# Patient Record
Sex: MALE | Race: WHITE
[De-identification: names, ages, dates, MRNs, and addresses within clinical notes are randomized per-mention and may not be internally consistent; named-entity substitution may affect disease eponyms.]

---

## 2018-03-08 ENCOUNTER — HOSPITAL ENCOUNTER (EMERGENCY)
Dept: HOSPITAL 36 - ER | Age: 53
Discharge: HOME | End: 2018-03-08
Payer: COMMERCIAL

## 2018-03-08 DIAGNOSIS — J20.8: Primary | ICD-10-CM

## 2018-03-08 DIAGNOSIS — F17.200: ICD-10-CM

## 2018-03-08 DIAGNOSIS — E11.9: ICD-10-CM

## 2018-03-08 PROCEDURE — Z7502: HCPCS

## 2018-03-08 NOTE — ED PHYSICIAN CHART
ED Chief Complaint/HPI





- Patient Information


Date Seen:: 03/08/18


Time Seen:: 10:30


Chief Complaint:: cough


History of Present Illness:: 





Patient's had a headache and a cough occasionally productive of green sputum 

for last 1 month.  No vomiting.  Patient has had some diarrhea.


Allergies:: 


 Allergies











Allergy/AdvReac Type Severity Reaction Status Date / Time


 


Penicillins Allergy   Verified 03/08/18 10:29











Vitals:: 


 Vital Signs - 8 hr











  03/08/18





  10:30


 


Temp 97.6 F


 





 


RR 22


 


/84


 


O2 Sat % 99











Historian:: Patient


Review:: Nurse's Note Reviewed





ED Review of Systems





- Review of Systems


General/Constitutional: No fever, No chills


Skin: No skin lesions


Head: No headache


Eyes: No loss of vision


ENT: No earache


Neck: No neck pain, No swelling, No thyromegaly, No stiffness


Cardio Vascular: No chest pain, No palpitations


Pulmonary: No SOB, Cough


GI: No nausea, No vomiting, Diarrhea


G/U: No dysuria, No frequency, No hematuria


Musculoskeletal: No bone or joint pain


Endocrine: No polyuria, No polydipsia


Psychiatric: No prior psych history, No depression, No anxiety


Hematopoietic: No bruising


Allergic/Immuno: No urticaria


Neurological: No syncope, No headache, No seizure





ED Past Medical History





- Past Medical History


Past Medical History: DM


Family History: Diabetes Melitus


Social History: Smoker, No Alcohol, Other (smokes one half pack of cigarettes a 

day)


Surgical History: None


Psychiatricy History: None


Medication: Reviewed





Family Medical History





- Family Member


  ** Mother


History Unknown: Yes


Ethnicity: 





ED Physical Exam





- Physical Examination


General/Constitutional: Well-developed, well-nourished, Alert, No distress


Head: Atraumatic


Eyes: Lids, conjuctiva normal, PERRL


Skin: Nl inspection, No rash, No skin lesions, No ecchymosis


ENMT: External ears, nose nl, TM canals nl, Nasal exam nl, Oropharynx nl, 

Tonsils nl


Other ENMT comments:: 





Several teeth carious to the gum line


Neck: No nuchal rigidity


Respiratory: Nl effort/Exclusion, Clear to Auscultation, No Wheeze/Rhonchi/Rales


Cardio Vascular: RRR, No murmur, gallop, rubs, NL S1 S2


GI: No tenderness/rebounding/guarding, No organomegaly, No hernia, Normal BS's


: No CVA tenderness


Extremities: Normal digits & nails


Neuro/Psych: No focal deficits


Misc: No paraspinal tenderness





ED Labs/Radiology/EKG Results





- Lab Results


Results: 


 Laboratory Tests











  03/08/18





  10:35


 


POC Glucose  285 H














ED Septic Shock





- .


Is Septic Shock (SBP<90, OR Lactate>4 mmol\L) present?: No





- <6hrs of presentation:


Vital Signs: 


 Vital Signs - 8 hr











  03/08/18





  10:30


 


Temp 97.6 F


 





 


RR 22


 


/84


 


O2 Sat % 99














ED Reassessment (Disposition)





- Reassessment


Reassessment Condition:: Unchanged





- Diagnosis


Diagnosis:: 





Viral bronchitis





- Aftercare/Follow up Instructions


Aftercare/Follow-Up Instructions:: Refer to Discharge Instructions


Medication Prescribed:: 





Robitussin-DM 4 ounces to take 2 teaspoons 4 times a day as necessary





- Patient Disposition


Discharge/Transfer:: Home


Condition at Disposition:: Stable, Unchanged





ED Discharge Plan





- Patient Disposition


Additional Instructions: 


AS TOLERATED.

## 2018-03-08 NOTE — DIAGNOSTIC IMAGING REPORT
CHEST X-RAY: AP view



INDICATION: Cough for one month



COMPARISON: None



FINDINGS: Patient is mildly rotated.  There is no focal consolidation or

pleural effusions The heart is normal in size. The osseous structures

demonstrate no acute abnormalities.



IMPRESSION: 



No focal consolidation identified.  Consider follow-up if symptoms

persist.

## 2019-07-09 ENCOUNTER — HOSPITAL ENCOUNTER (EMERGENCY)
Dept: HOSPITAL 36 - ER | Age: 54
Discharge: HOME | End: 2019-07-09
Payer: MEDICARE

## 2019-07-09 DIAGNOSIS — Z88.0: ICD-10-CM

## 2019-07-09 DIAGNOSIS — Z59.0: ICD-10-CM

## 2019-07-09 DIAGNOSIS — I10: ICD-10-CM

## 2019-07-09 DIAGNOSIS — M19.90: ICD-10-CM

## 2019-07-09 DIAGNOSIS — E11.9: ICD-10-CM

## 2019-07-09 DIAGNOSIS — G44.1: Primary | ICD-10-CM

## 2019-07-09 SDOH — ECONOMIC STABILITY - HOUSING INSECURITY: HOMELESSNESS: Z59.0

## 2019-07-09 NOTE — ED PHYSICIAN CHART
ED Chief Complaint/HPI





- Patient Information


Date Seen:: 07/09/19


Time Seen:: 14:20


Chief Complaint:: Headaches


History of Present Illness:: 





onset x 3 days of intermittent, dull, diffuse Headaches; pt denies trauma, LOC, 

ALOC, AMS, decreased activity, visual or gait changes, weakness, dizziness, 

paresthesias, vertigo, E/As, S/T, neck pain, cough, C/P, SOB, Abd. Pain, A/N/V/D

/C, bleeding, fever, chills, or urinary s/s; pt is eating and urinating well; 

pt last urinated one hour PTA


Allergies:: 


 Allergies











Allergy/AdvReac Type Severity Reaction Status Date / Time


 


Penicillins Allergy   Verified 07/09/19 14:18











Vitals:: 


 Vital Signs - 8 hr











  07/09/19 07/09/19





  14:21 15:46


 


 100


 


RR 18 16


 


/83 142/80


 


O2 Sat % 99 98











Historian:: Patient


Review:: Nurse's Note Reviewed, Old Chart Reviewed





ED Review of Systems





- Review of Systems


General/Constitutional: No fever, No chills, No weight loss, No weakness, No 

diaphoresis, No edema, No loss of appetite


Skin: No skin lesions, No rash, No bruising


Head: No headache, No light-headedness


Eyes: No loss of vision, No pain, No diplopia


ENT: No earache, No nasal drainage, No sore throat, No tinnitus


Neck: No neck pain, No swelling, No thyromegaly, No stiffness, No mass noted


Cardio Vascular: No chest pain, No palpitations, No PND, No orthopnea, No edema


Pulmonary: No SOB, No cough, No sputum, No wheezing


GI: No nausea, No vomiting, No diarrhea, No pain, No melena, No hematochezia, 

No constipation, No hematemesis


G/U: No dysuria, No frequency, No hematuria, No nacturia


Musculoskeletal: No bone or joint pain, No back pain, No muscle pain


Endocrine: No polyuria, No polydipsia


Psychiatric: No prior psych history, No depression, No anxiety, No suicidal 

ideation, No homicidal ideation, No auditory hallucination, No visual 

hallucination


Hematopoietic: No bruising, No lymphadenopathy


Allergic/Immuno: No urticaria, No angioedema


Neurological: No syncope, No focal symptoms, No weakness, No paresthesia, No 

headache, No seizure, No dizziness, No confusion, No vertigo





ED Past Medical History





- Past Medical History


Obtainable: Yes


Past Medical History: HTN, DM


Family History: Diabetes Melitus, HTN


Social History: Non Smoker, No Alcohol, No Drug Use, Single


Surgical History: None


Psychiatricy History: None


Medication: Reviewed





Family Medical History





- Family Member


  ** Mother


History Unknown: Yes


Ethnicity: 





ED Physical Exam





- Physical Examination


General/Constitutional: Awake, Well-developed, well-nourished, Alert, No 

distress, GCS 15, Non-toxic appearing, Ambulatory


Head: Atraumatic


Other Head comments:: 





PERRLA; Fundi: benign; EOMs: WNL


Eyes: Lids, conjuctiva normal, PERRL, EOMI


Skin: Nl inspection, No rash, No skin lesions, No ecchymosis, Well hydrated, No 

lymphadenopathy


ENMT: External ears, nose nl, TM canals nl, Nasal exam nl, Lips, teeth, gums nl

, Oropharynx nl, Tonsils nl


Other ENMT comments:: 





TMJs: WNL


Neck: Nontender, Full ROM w/o pain, No JVD, No nuchal rigidity, No bruit, No 

mass, No stridor


Other Neck comments:: 





supple; no meningeal signs; no cervical tenderness; no bruits


Respiratory: Nl effort/Exclusion, Clear to Auscultation, No Wheeze/Rhonchi/Rales


Cardio Vascular: RRR, No murmur, gallop, rubs, NL S1 S2, Carotid/Femoral/Distal 

pulses equal bilaterally


GI: No tenderness/rebounding/guarding, No organomegaly, No hernia, Normal BS's, 

Nondistended, No mass/bruits, No McBurney tenderness, Rectum exam nl


Other GI comments:: 





no pulsatile masses


: No CVA tenderness


Extremities: No tenderness or effusion, Full ROM, normal strength in all 

extremities, No edema, Normal digits & nails


Neuro/Psych: Alert/oriented, DTR's symmetric, Normal sensory exam, Normal motor 

strength, Judgement/insight normal, Mood normal, Normal gait, No focal deficits


Other Neuro/Psych comments:: 





no focal signs


Misc: Normal back, No paraspinal tenderness





ED Labs/Radiology/EKG Results





- Lab Results


Comments:: 





Accu-check: Blood Glucose: 145; Final BP: 136/82





- Radiology Results


Comments:: 





DJD Changes; NAD





ED Septic Shock





- .


Is Septic Shock (SBP<90, OR Lactate>4 mmol\L) present?: No





- <6hrs of presentation:


Vital Signs: 


 Vital Signs - 8 hr











  07/09/19 07/09/19





  14:21 15:46


 


 100


 


RR 18 16


 


/83 142/80


 


O2 Sat % 99 98














ED Reassessment (Disposition)





- Reassessment


Reassessment:: 





pt tolerated po fluids well in ER; pt is asymptomatic upon discharge


Reassessment Condition:: Improved





- Diagnosis


Diagnosis:: 





Headaches; DM; HTN; Vascular Cephalgia; Osteoarthritis; Migraine/tension 

Headaches





- Aftercare/Follow up Instructions


Aftercare/Follow-Up Instructions:: Counseled pt regarding lab results/diagnosis 

& need follow up, Refer to Discharge Instructions, Counseled pt & family 

regarding lab results/diagnosis & need follow up





- Patient Disposition


Discharge/Transfer:: Home


Condition at Disposition:: Stable, Improved (X-Rays Instructions; RTER prn if 

existing s/s reoccur and/or get worse and/or any other new s/s occur; ACIs 

given for all above Dx; Have Blood Pressure re-checked in one day by PMD; Refer 

to Neurologist/Internist ASAP; F/U with PMD in one day or prn; RTER prn if 

concerned)

## 2019-07-10 NOTE — DIAGNOSTIC IMAGING REPORT
Head CT without intravenous contrast



Indication: Headache



Comparison: None 



Technique: Axial images were obtained from the vertex to the skull base

without IV contrast. Coronal reconstructions were made.   Total DLP:

724,  [id number]





FINDINGS:



Images of the brain obtained without contrast demonstrate no evidence of

an acute hemorrhage.  The gray-white matter differentiation is

preserved.  The ventricles and basal cisterns are patent.  No mass

effect or midline shift.  No evidence of a skull fracture or focal soft

tissue swelling.  There appear to be multiple scalp nodules.  The

paranasal sinuses are clear.



IMPRESSION:



No acute intracranial abnormality.



There appear to be multiple scalp nodules.  Please correlate with

clinical findings.  Is there a history of etiology such as

neurofibromatosis?

## 2019-07-10 NOTE — DIAGNOSTIC IMAGING REPORT
CT cervical spine without IV contrast



HISTORY: Pain



COMPARISON: None



Technique: Axial images were obtained from the skull base to the upper

thoracic spine without IV contrast. Multiplanar reconstructions were

made. Total DLP: 468,  [id number]



FINDINGS:



Images of the cervical spine obtained without contrast demonstrate no

evidence of an acute fracture or subluxation.  There is reversal of the

cervical lordosis most pronounced at the C5/C6 level.  Multilevel

moderate to advanced degenerative changes are seen with moderate disc

space loss of height at C5/C6.  Multilevel marginal osteophytic spurs

are noted.  There is also large subchondral vacuum phenomena seen along

the posterior aspect of the C5 vertebral body measuring up to 7 mm. 

There are also marked degenerative changes of the atlantodental

articulation with subchondral cyst seen within the anterior odontoid

process measuring 6 mm.  Sclerotic changes of the dens are noted likely

degenerative in etiology.  No prevertebral soft tissue swelling.  There

are moderate to severe facet degenerative changes seen at C2/C3 on the

left no prevertebral soft tissue swelling.  



The lung apices are clear. Atherosclerosis is noted.



IMPRESSION:



No evidence of acute fracture or subluxation



Moderate to advanced degenerative changes as detailed above



Reversal of the cervical lordosis most pronounced at C5/C6.



Atherosclerotic vascular disease.

## 2023-07-28 ENCOUNTER — HOSPITAL ENCOUNTER (INPATIENT)
Dept: HOSPITAL 15 - ER | Age: 58
LOS: 11 days | Discharge: HOME HEALTH | DRG: 602 | End: 2023-08-08
Attending: FAMILY MEDICINE | Admitting: FAMILY MEDICINE
Payer: MEDICARE

## 2023-07-28 VITALS — BODY MASS INDEX: 23.01 KG/M2 | HEIGHT: 67 IN | WEIGHT: 146.61 LBS

## 2023-07-28 VITALS — OXYGEN SATURATION: 99 % | HEART RATE: 102 BPM | RESPIRATION RATE: 25 BRPM

## 2023-07-28 VITALS — OXYGEN SATURATION: 98 % | HEART RATE: 106 BPM | RESPIRATION RATE: 28 BRPM

## 2023-07-28 DIAGNOSIS — E43: ICD-10-CM

## 2023-07-28 DIAGNOSIS — L03.116: Primary | ICD-10-CM

## 2023-07-28 DIAGNOSIS — M86.8X7: ICD-10-CM

## 2023-07-28 DIAGNOSIS — F19.90: ICD-10-CM

## 2023-07-28 DIAGNOSIS — Z91.148: ICD-10-CM

## 2023-07-28 DIAGNOSIS — Z88.0: ICD-10-CM

## 2023-07-28 DIAGNOSIS — E11.65: ICD-10-CM

## 2023-07-28 DIAGNOSIS — F17.210: ICD-10-CM

## 2023-07-28 DIAGNOSIS — E11.69: ICD-10-CM

## 2023-07-28 DIAGNOSIS — E11.621: ICD-10-CM

## 2023-07-28 DIAGNOSIS — Z59.00: ICD-10-CM

## 2023-07-28 DIAGNOSIS — Z79.899: ICD-10-CM

## 2023-07-28 DIAGNOSIS — L97.529: ICD-10-CM

## 2023-07-28 DIAGNOSIS — I10: ICD-10-CM

## 2023-07-28 LAB
ALBUMIN SERPL-MCNC: 2.7 G/DL (ref 3.4–5)
ALP SERPL-CCNC: 114 U/L (ref 45–117)
ALT SERPL-CCNC: 20 U/L (ref 16–61)
ANION GAP SERPL CALCULATED.3IONS-SCNC: 6 MMOL/L (ref 5–15)
BILIRUB SERPL-MCNC: 0.2 MG/DL (ref 0.2–1)
BUN SERPL-MCNC: 16 MG/DL (ref 7–18)
BUN/CREAT SERPL: 9.7 (ref 10–20)
CALCIUM SERPL-MCNC: 8.4 MG/DL (ref 8.5–10.1)
CHLORIDE SERPL-SCNC: 108 MMOL/L (ref 98–107)
CO2 SERPL-SCNC: 28 MMOL/L (ref 21–32)
GLUCOSE SERPL-MCNC: 244 MG/DL (ref 74–106)
HCT VFR BLD AUTO: 26.7 % (ref 41–53)
HGB BLD-MCNC: 8.7 G/DL (ref 13.5–17.5)
LIPASE SERPL-CCNC: 147 U/L (ref 73–393)
MCH RBC QN AUTO: 28.4 PG (ref 28–32)
MCV RBC AUTO: 86.8 FL (ref 80–100)
NRBC BLD QL AUTO: 0 %
POTASSIUM SERPL-SCNC: 3.9 MMOL/L (ref 3.5–5.1)
PROT SERPL-MCNC: 7.8 G/DL (ref 6.4–8.2)
PROT UR STRIP.AUTO-MCNC: (no result) MG/DL
SODIUM SERPL-SCNC: 142 MMOL/L (ref 136–145)

## 2023-07-28 PROCEDURE — 80048 BASIC METABOLIC PNL TOTAL CA: CPT

## 2023-07-28 PROCEDURE — 86900 BLOOD TYPING SEROLOGIC ABO: CPT

## 2023-07-28 PROCEDURE — 81001 URINALYSIS AUTO W/SCOPE: CPT

## 2023-07-28 PROCEDURE — 86850 RBC ANTIBODY SCREEN: CPT

## 2023-07-28 PROCEDURE — 80053 COMPREHEN METABOLIC PANEL: CPT

## 2023-07-28 PROCEDURE — 87040 BLOOD CULTURE FOR BACTERIA: CPT

## 2023-07-28 PROCEDURE — 83036 HEMOGLOBIN GLYCOSYLATED A1C: CPT

## 2023-07-28 PROCEDURE — 80202 ASSAY OF VANCOMYCIN: CPT

## 2023-07-28 PROCEDURE — 87077 CULTURE AEROBIC IDENTIFY: CPT

## 2023-07-28 PROCEDURE — 84484 ASSAY OF TROPONIN QUANT: CPT

## 2023-07-28 PROCEDURE — 93017 CV STRESS TEST TRACING ONLY: CPT

## 2023-07-28 PROCEDURE — 85025 COMPLETE CBC W/AUTO DIFF WBC: CPT

## 2023-07-28 PROCEDURE — 73700 CT LOWER EXTREMITY W/O DYE: CPT

## 2023-07-28 PROCEDURE — 87205 SMEAR GRAM STAIN: CPT

## 2023-07-28 PROCEDURE — 83605 ASSAY OF LACTIC ACID: CPT

## 2023-07-28 PROCEDURE — 86901 BLOOD TYPING SEROLOGIC RH(D): CPT

## 2023-07-28 PROCEDURE — 71045 X-RAY EXAM CHEST 1 VIEW: CPT

## 2023-07-28 PROCEDURE — 36569 INSJ PICC 5 YR+ W/O IMAGING: CPT

## 2023-07-28 PROCEDURE — 36415 COLL VENOUS BLD VENIPUNCTURE: CPT

## 2023-07-28 PROCEDURE — 85730 THROMBOPLASTIN TIME PARTIAL: CPT

## 2023-07-28 PROCEDURE — 78452 HT MUSCLE IMAGE SPECT MULT: CPT

## 2023-07-28 PROCEDURE — 83690 ASSAY OF LIPASE: CPT

## 2023-07-28 PROCEDURE — 93306 TTE W/DOPPLER COMPLETE: CPT

## 2023-07-28 PROCEDURE — 80061 LIPID PANEL: CPT

## 2023-07-28 PROCEDURE — 87081 CULTURE SCREEN ONLY: CPT

## 2023-07-28 PROCEDURE — 93005 ELECTROCARDIOGRAM TRACING: CPT

## 2023-07-28 PROCEDURE — 93925 LOWER EXTREMITY STUDY: CPT

## 2023-07-28 PROCEDURE — 84443 ASSAY THYROID STIM HORMONE: CPT

## 2023-07-28 PROCEDURE — 80307 DRUG TEST PRSMV CHEM ANLYZR: CPT

## 2023-07-28 PROCEDURE — 85610 PROTHROMBIN TIME: CPT

## 2023-07-28 PROCEDURE — 82962 GLUCOSE BLOOD TEST: CPT

## 2023-07-28 PROCEDURE — 73630 X-RAY EXAM OF FOOT: CPT

## 2023-07-28 PROCEDURE — 83880 ASSAY OF NATRIURETIC PEPTIDE: CPT

## 2023-07-28 PROCEDURE — 87186 SC STD MICRODIL/AGAR DIL: CPT

## 2023-07-28 RX ADMIN — SODIUM CHLORIDE SCH MLS/HR: 4.5 INJECTION, SOLUTION INTRAVENOUS at 23:24

## 2023-07-28 SDOH — ECONOMIC STABILITY - HOUSING INSECURITY: HOMELESSNESS UNSPECIFIED: Z59.00

## 2023-07-29 VITALS
DIASTOLIC BLOOD PRESSURE: 88 MMHG | RESPIRATION RATE: 19 BRPM | HEART RATE: 96 BPM | TEMPERATURE: 97.5 F | SYSTOLIC BLOOD PRESSURE: 146 MMHG | OXYGEN SATURATION: 98 %

## 2023-07-29 VITALS
SYSTOLIC BLOOD PRESSURE: 114 MMHG | OXYGEN SATURATION: 100 % | DIASTOLIC BLOOD PRESSURE: 72 MMHG | TEMPERATURE: 97.8 F | RESPIRATION RATE: 18 BRPM | HEART RATE: 97 BPM

## 2023-07-29 VITALS — OXYGEN SATURATION: 100 % | RESPIRATION RATE: 18 BRPM

## 2023-07-29 VITALS
OXYGEN SATURATION: 96 % | DIASTOLIC BLOOD PRESSURE: 74 MMHG | TEMPERATURE: 98.1 F | RESPIRATION RATE: 18 BRPM | SYSTOLIC BLOOD PRESSURE: 137 MMHG | HEART RATE: 81 BPM

## 2023-07-29 VITALS
DIASTOLIC BLOOD PRESSURE: 72 MMHG | SYSTOLIC BLOOD PRESSURE: 114 MMHG | RESPIRATION RATE: 18 BRPM | OXYGEN SATURATION: 100 % | HEART RATE: 97 BPM | TEMPERATURE: 97.8 F

## 2023-07-29 VITALS
HEART RATE: 96 BPM | SYSTOLIC BLOOD PRESSURE: 146 MMHG | TEMPERATURE: 97.5 F | OXYGEN SATURATION: 98 % | DIASTOLIC BLOOD PRESSURE: 88 MMHG | RESPIRATION RATE: 19 BRPM

## 2023-07-29 VITALS — HEART RATE: 80 BPM | OXYGEN SATURATION: 97 % | RESPIRATION RATE: 18 BRPM

## 2023-07-29 LAB
ALBUMIN SERPL-MCNC: 2.4 G/DL (ref 3.4–5)
ALCOHOL, URINE: < 3 MG/DL (ref 0–10)
ALP SERPL-CCNC: 125 U/L (ref 45–117)
ALT SERPL-CCNC: 18 U/L (ref 16–61)
AMPHETAMINES UR QL SCN: POSITIVE
ANION GAP SERPL CALCULATED.3IONS-SCNC: 4 MMOL/L (ref 5–15)
BARBITURATES UR QL SCN: NEGATIVE
BENZODIAZ UR QL SCN: NEGATIVE
BILIRUB SERPL-MCNC: 0.1 MG/DL (ref 0.2–1)
BUN SERPL-MCNC: 20 MG/DL (ref 7–18)
BUN/CREAT SERPL: 11 (ref 10–20)
BZE UR QL SCN: NEGATIVE
CALCIUM SERPL-MCNC: 7.9 MG/DL (ref 8.5–10.1)
CANNABINOIDS UR QL SCN: NEGATIVE
CHLORIDE SERPL-SCNC: 109 MMOL/L (ref 98–107)
CO2 SERPL-SCNC: 28 MMOL/L (ref 21–32)
GLUCOSE SERPL-MCNC: 262 MG/DL (ref 74–106)
HCT VFR BLD AUTO: 25.2 % (ref 41–53)
HGB BLD-MCNC: 8.3 G/DL (ref 13.5–17.5)
MCH RBC QN AUTO: 28.7 PG (ref 28–32)
MCV RBC AUTO: 87.6 FL (ref 80–100)
NRBC BLD QL AUTO: 0 %
OPIATES UR QL SCN: NEGATIVE
PCP UR QL SCN: NEGATIVE
POTASSIUM SERPL-SCNC: 4.1 MMOL/L (ref 3.5–5.1)
PROT SERPL-MCNC: 7.2 G/DL (ref 6.4–8.2)
SODIUM SERPL-SCNC: 141 MMOL/L (ref 136–145)

## 2023-07-29 RX ADMIN — HUMAN INSULIN SCH UNITS: 100 INJECTION, SOLUTION SUBCUTANEOUS at 11:30

## 2023-07-29 RX ADMIN — OXYCODONE HYDROCHLORIDE AND ACETAMINOPHEN SCH MG: 500 TABLET ORAL at 10:00

## 2023-07-29 RX ADMIN — HYDROCODONE BITARTRATE AND ACETAMINOPHEN PRN TAB: 5; 325 TABLET ORAL at 11:47

## 2023-07-29 RX ADMIN — Medication SCH STRIP: at 11:37

## 2023-07-29 RX ADMIN — HUMAN INSULIN SCH UNITS: 100 INJECTION, SOLUTION SUBCUTANEOUS at 17:52

## 2023-07-29 RX ADMIN — Medication SCH STRIP: at 06:33

## 2023-07-29 RX ADMIN — VANCOMYCIN HYDROCHLORIDE SCH MLS/HR: 1 INJECTION, POWDER, LYOPHILIZED, FOR SOLUTION INTRAVENOUS at 15:01

## 2023-07-29 RX ADMIN — HUMAN INSULIN SCH UNITS: 100 INJECTION, SOLUTION SUBCUTANEOUS at 06:37

## 2023-07-29 RX ADMIN — OXYCODONE HYDROCHLORIDE AND ACETAMINOPHEN SCH MG: 500 TABLET ORAL at 22:02

## 2023-07-29 RX ADMIN — HUMAN INSULIN SCH UNITS: 100 INJECTION, SOLUTION SUBCUTANEOUS at 22:00

## 2023-07-29 RX ADMIN — DEXTROSE SCH UNITS: 5 SOLUTION INTRAVENOUS at 22:02

## 2023-07-29 RX ADMIN — ZINC SULFATE CAP 220 MG (50 MG ELEMENTAL ZN) SCH MG: 220 (50 ZN) CAP at 10:00

## 2023-07-29 RX ADMIN — Medication SCH STRIP: at 21:57

## 2023-07-29 RX ADMIN — SODIUM CHLORIDE SCH MLS/HR: 4.5 INJECTION, SOLUTION INTRAVENOUS at 18:30

## 2023-07-29 RX ADMIN — HYDROCODONE BITARTRATE AND ACETAMINOPHEN PRN TAB: 5; 325 TABLET ORAL at 00:16

## 2023-07-29 RX ADMIN — HYDRALAZINE HYDROCHLORIDE PRN MG: 20 INJECTION INTRAMUSCULAR; INTRAVENOUS at 07:04

## 2023-07-29 RX ADMIN — Medication SCH STRIP: at 17:51

## 2023-07-29 RX ADMIN — DEXTROSE SCH UNITS: 5 SOLUTION INTRAVENOUS at 10:00

## 2023-07-30 VITALS
OXYGEN SATURATION: 97 % | DIASTOLIC BLOOD PRESSURE: 79 MMHG | SYSTOLIC BLOOD PRESSURE: 137 MMHG | TEMPERATURE: 98.6 F | HEART RATE: 91 BPM | RESPIRATION RATE: 18 BRPM

## 2023-07-30 VITALS
OXYGEN SATURATION: 98 % | SYSTOLIC BLOOD PRESSURE: 127 MMHG | RESPIRATION RATE: 14 BRPM | TEMPERATURE: 97.8 F | DIASTOLIC BLOOD PRESSURE: 64 MMHG | HEART RATE: 100 BPM

## 2023-07-30 VITALS
SYSTOLIC BLOOD PRESSURE: 145 MMHG | OXYGEN SATURATION: 94 % | TEMPERATURE: 98.3 F | DIASTOLIC BLOOD PRESSURE: 76 MMHG | HEART RATE: 76 BPM | RESPIRATION RATE: 18 BRPM

## 2023-07-30 VITALS — RESPIRATION RATE: 18 BRPM | OXYGEN SATURATION: 100 %

## 2023-07-30 VITALS
RESPIRATION RATE: 16 BRPM | OXYGEN SATURATION: 94 % | DIASTOLIC BLOOD PRESSURE: 74 MMHG | HEART RATE: 100 BPM | TEMPERATURE: 97.5 F | SYSTOLIC BLOOD PRESSURE: 110 MMHG

## 2023-07-30 VITALS
SYSTOLIC BLOOD PRESSURE: 149 MMHG | TEMPERATURE: 97.9 F | RESPIRATION RATE: 16 BRPM | DIASTOLIC BLOOD PRESSURE: 80 MMHG | OXYGEN SATURATION: 99 % | HEART RATE: 92 BPM

## 2023-07-30 VITALS — RESPIRATION RATE: 18 BRPM | HEART RATE: 82 BPM | OXYGEN SATURATION: 95 %

## 2023-07-30 LAB
APTT PPP: 30.5 SEC (ref 24.5–34.5)
CHOLEST SERPL-MCNC: 152 MG/DL (ref ?–200)
HDLC SERPL-MCNC: 34 MG/DL (ref 40–59)
INR PPP: 1.05 (ref 0.9–1.15)
PROTHROMBIN TIME: 11 SEC (ref 9.3–11.8)
TRIGL SERPL-MCNC: 257 MG/DL (ref ?–150)

## 2023-07-30 RX ADMIN — ATORVASTATIN CALCIUM SCH MG: 20 TABLET, FILM COATED ORAL at 21:21

## 2023-07-30 RX ADMIN — Medication SCH STRIP: at 12:04

## 2023-07-30 RX ADMIN — OXYCODONE HYDROCHLORIDE AND ACETAMINOPHEN SCH MG: 500 TABLET ORAL at 21:21

## 2023-07-30 RX ADMIN — HUMAN INSULIN SCH UNITS: 100 INJECTION, SOLUTION SUBCUTANEOUS at 12:06

## 2023-07-30 RX ADMIN — DEXTROSE SCH UNITS: 5 SOLUTION INTRAVENOUS at 21:27

## 2023-07-30 RX ADMIN — Medication SCH STRIP: at 17:00

## 2023-07-30 RX ADMIN — VANCOMYCIN HYDROCHLORIDE SCH MLS/HR: 1 INJECTION, POWDER, LYOPHILIZED, FOR SOLUTION INTRAVENOUS at 14:11

## 2023-07-30 RX ADMIN — Medication SCH STRIP: at 06:20

## 2023-07-30 RX ADMIN — SODIUM CHLORIDE SCH MLS/HR: 4.5 INJECTION, SOLUTION INTRAVENOUS at 14:30

## 2023-07-30 RX ADMIN — HUMAN INSULIN SCH UNITS: 100 INJECTION, SOLUTION SUBCUTANEOUS at 06:24

## 2023-07-30 RX ADMIN — HUMAN INSULIN SCH UNITS: 100 INJECTION, SOLUTION SUBCUTANEOUS at 18:04

## 2023-07-30 RX ADMIN — DEXTROSE SCH UNITS: 5 SOLUTION INTRAVENOUS at 09:50

## 2023-07-30 RX ADMIN — ZINC SULFATE CAP 220 MG (50 MG ELEMENTAL ZN) SCH MG: 220 (50 ZN) CAP at 09:45

## 2023-07-30 RX ADMIN — Medication SCH STRIP: at 21:15

## 2023-07-30 RX ADMIN — HUMAN INSULIN SCH UNITS: 100 INJECTION, SOLUTION SUBCUTANEOUS at 21:19

## 2023-07-30 RX ADMIN — OXYCODONE HYDROCHLORIDE AND ACETAMINOPHEN SCH MG: 500 TABLET ORAL at 09:44

## 2023-07-30 RX ADMIN — METOPROLOL SUCCINATE SCH MG: 50 TABLET, EXTENDED RELEASE ORAL at 14:10

## 2023-07-31 VITALS
HEART RATE: 83 BPM | RESPIRATION RATE: 20 BRPM | SYSTOLIC BLOOD PRESSURE: 112 MMHG | DIASTOLIC BLOOD PRESSURE: 52 MMHG | TEMPERATURE: 97.8 F | OXYGEN SATURATION: 95 %

## 2023-07-31 VITALS
DIASTOLIC BLOOD PRESSURE: 58 MMHG | RESPIRATION RATE: 21 BRPM | SYSTOLIC BLOOD PRESSURE: 140 MMHG | HEART RATE: 71 BPM | OXYGEN SATURATION: 96 % | TEMPERATURE: 97.8 F

## 2023-07-31 VITALS
SYSTOLIC BLOOD PRESSURE: 118 MMHG | TEMPERATURE: 98.4 F | HEART RATE: 73 BPM | DIASTOLIC BLOOD PRESSURE: 52 MMHG | OXYGEN SATURATION: 98 % | RESPIRATION RATE: 17 BRPM

## 2023-07-31 VITALS — OXYGEN SATURATION: 96 % | RESPIRATION RATE: 18 BRPM

## 2023-07-31 VITALS
SYSTOLIC BLOOD PRESSURE: 158 MMHG | HEART RATE: 72 BPM | RESPIRATION RATE: 17 BRPM | OXYGEN SATURATION: 96 % | TEMPERATURE: 98.2 F | DIASTOLIC BLOOD PRESSURE: 64 MMHG

## 2023-07-31 VITALS
OXYGEN SATURATION: 98 % | SYSTOLIC BLOOD PRESSURE: 108 MMHG | HEART RATE: 74 BPM | TEMPERATURE: 97.9 F | DIASTOLIC BLOOD PRESSURE: 57 MMHG | RESPIRATION RATE: 15 BRPM

## 2023-07-31 VITALS — DIASTOLIC BLOOD PRESSURE: 84 MMHG | SYSTOLIC BLOOD PRESSURE: 146 MMHG

## 2023-07-31 LAB
ANION GAP SERPL CALCULATED.3IONS-SCNC: 7 MMOL/L (ref 5–15)
BUN SERPL-MCNC: 36 MG/DL (ref 7–18)
BUN/CREAT SERPL: 18.7 (ref 10–20)
CALCIUM SERPL-MCNC: 8 MG/DL (ref 8.5–10.1)
CHLORIDE SERPL-SCNC: 107 MMOL/L (ref 98–107)
CO2 SERPL-SCNC: 25 MMOL/L (ref 21–32)
GLUCOSE SERPL-MCNC: 148 MG/DL (ref 74–106)
POTASSIUM SERPL-SCNC: 4.3 MMOL/L (ref 3.5–5.1)
SODIUM SERPL-SCNC: 139 MMOL/L (ref 136–145)

## 2023-07-31 PROCEDURE — 02HV33Z INSERTION OF INFUSION DEVICE INTO SUPERIOR VENA CAVA, PERCUTANEOUS APPROACH: ICD-10-PCS | Performed by: FAMILY MEDICINE

## 2023-07-31 PROCEDURE — B548ZZA ULTRASONOGRAPHY OF SUPERIOR VENA CAVA, GUIDANCE: ICD-10-PCS | Performed by: FAMILY MEDICINE

## 2023-07-31 RX ADMIN — NICOTINE SCH PATCH: 21 PATCH, EXTENDED RELEASE TRANSDERMAL at 10:18

## 2023-07-31 RX ADMIN — Medication SCH STRIP: at 13:22

## 2023-07-31 RX ADMIN — VANCOMYCIN HYDROCHLORIDE SCH MLS/HR: 1 INJECTION, POWDER, LYOPHILIZED, FOR SOLUTION INTRAVENOUS at 13:21

## 2023-07-31 RX ADMIN — METOPROLOL SUCCINATE SCH MG: 50 TABLET, EXTENDED RELEASE ORAL at 13:22

## 2023-07-31 RX ADMIN — Medication SCH STRIP: at 17:50

## 2023-07-31 RX ADMIN — HYDROCODONE BITARTRATE AND ACETAMINOPHEN PRN TAB: 5; 325 TABLET ORAL at 13:24

## 2023-07-31 RX ADMIN — Medication SCH STRIP: at 06:31

## 2023-07-31 RX ADMIN — OXYCODONE HYDROCHLORIDE AND ACETAMINOPHEN SCH MG: 500 TABLET ORAL at 10:18

## 2023-07-31 RX ADMIN — DEXTROSE SCH UNITS: 5 SOLUTION INTRAVENOUS at 21:32

## 2023-07-31 RX ADMIN — HUMAN INSULIN SCH UNITS: 100 INJECTION, SOLUTION SUBCUTANEOUS at 17:51

## 2023-07-31 RX ADMIN — Medication SCH STRIP: at 21:24

## 2023-07-31 RX ADMIN — DEXTROSE SCH UNITS: 5 SOLUTION INTRAVENOUS at 10:25

## 2023-07-31 RX ADMIN — ZINC SULFATE CAP 220 MG (50 MG ELEMENTAL ZN) SCH MG: 220 (50 ZN) CAP at 10:18

## 2023-07-31 RX ADMIN — OXYCODONE HYDROCHLORIDE AND ACETAMINOPHEN SCH MG: 500 TABLET ORAL at 21:33

## 2023-07-31 RX ADMIN — HUMAN INSULIN SCH UNITS: 100 INJECTION, SOLUTION SUBCUTANEOUS at 13:29

## 2023-07-31 RX ADMIN — ATORVASTATIN CALCIUM SCH MG: 20 TABLET, FILM COATED ORAL at 21:33

## 2023-07-31 RX ADMIN — SODIUM CHLORIDE SCH MLS/HR: 4.5 INJECTION, SOLUTION INTRAVENOUS at 10:25

## 2023-07-31 RX ADMIN — HUMAN INSULIN SCH UNITS: 100 INJECTION, SOLUTION SUBCUTANEOUS at 06:31

## 2023-07-31 RX ADMIN — SODIUM CHLORIDE SCH ML: 9 INJECTION INTRAMUSCULAR; INTRAVENOUS; SUBCUTANEOUS at 21:33

## 2023-07-31 RX ADMIN — HUMAN INSULIN SCH UNITS: 100 INJECTION, SOLUTION SUBCUTANEOUS at 21:32

## 2023-08-01 VITALS
TEMPERATURE: 98.5 F | OXYGEN SATURATION: 94 % | RESPIRATION RATE: 20 BRPM | HEART RATE: 72 BPM | SYSTOLIC BLOOD PRESSURE: 142 MMHG | DIASTOLIC BLOOD PRESSURE: 67 MMHG

## 2023-08-01 VITALS
OXYGEN SATURATION: 96 % | HEART RATE: 71 BPM | SYSTOLIC BLOOD PRESSURE: 146 MMHG | TEMPERATURE: 98.3 F | RESPIRATION RATE: 17 BRPM | DIASTOLIC BLOOD PRESSURE: 53 MMHG

## 2023-08-01 VITALS
TEMPERATURE: 98 F | RESPIRATION RATE: 21 BRPM | DIASTOLIC BLOOD PRESSURE: 62 MMHG | OXYGEN SATURATION: 99 % | SYSTOLIC BLOOD PRESSURE: 135 MMHG | HEART RATE: 78 BPM

## 2023-08-01 VITALS
OXYGEN SATURATION: 97 % | DIASTOLIC BLOOD PRESSURE: 67 MMHG | RESPIRATION RATE: 20 BRPM | HEART RATE: 72 BPM | SYSTOLIC BLOOD PRESSURE: 142 MMHG | TEMPERATURE: 98.5 F

## 2023-08-01 VITALS — HEART RATE: 76 BPM | RESPIRATION RATE: 18 BRPM

## 2023-08-01 VITALS
TEMPERATURE: 98 F | HEART RATE: 69 BPM | RESPIRATION RATE: 21 BRPM | OXYGEN SATURATION: 98 % | DIASTOLIC BLOOD PRESSURE: 48 MMHG | SYSTOLIC BLOOD PRESSURE: 110 MMHG

## 2023-08-01 LAB
ANION GAP SERPL CALCULATED.3IONS-SCNC: 6 MMOL/L (ref 5–15)
BUN SERPL-MCNC: 37 MG/DL (ref 7–18)
BUN/CREAT SERPL: 22.4 (ref 10–20)
CALCIUM SERPL-MCNC: 7.6 MG/DL (ref 8.5–10.1)
CHLORIDE SERPL-SCNC: 108 MMOL/L (ref 98–107)
CO2 SERPL-SCNC: 24 MMOL/L (ref 21–32)
GLUCOSE SERPL-MCNC: 256 MG/DL (ref 74–106)
POTASSIUM SERPL-SCNC: 4 MMOL/L (ref 3.5–5.1)
SODIUM SERPL-SCNC: 138 MMOL/L (ref 136–145)

## 2023-08-01 RX ADMIN — HUMAN INSULIN SCH UNITS: 100 INJECTION, SOLUTION SUBCUTANEOUS at 06:22

## 2023-08-01 RX ADMIN — HYDROCODONE BITARTRATE AND ACETAMINOPHEN PRN TAB: 5; 325 TABLET ORAL at 01:29

## 2023-08-01 RX ADMIN — HUMAN INSULIN SCH UNITS: 100 INJECTION, SOLUTION SUBCUTANEOUS at 11:16

## 2023-08-01 RX ADMIN — OXYCODONE HYDROCHLORIDE AND ACETAMINOPHEN SCH MG: 500 TABLET ORAL at 11:07

## 2023-08-01 RX ADMIN — HUMAN INSULIN SCH UNITS: 100 INJECTION, SOLUTION SUBCUTANEOUS at 17:31

## 2023-08-01 RX ADMIN — Medication SCH STRIP: at 22:02

## 2023-08-01 RX ADMIN — SODIUM CHLORIDE SCH ML: 9 INJECTION INTRAMUSCULAR; INTRAVENOUS; SUBCUTANEOUS at 11:06

## 2023-08-01 RX ADMIN — OXYCODONE HYDROCHLORIDE AND ACETAMINOPHEN SCH MG: 500 TABLET ORAL at 22:02

## 2023-08-01 RX ADMIN — DEXTROSE SCH UNITS: 5 SOLUTION INTRAVENOUS at 11:14

## 2023-08-01 RX ADMIN — DEXTROSE SCH UNITS: 5 SOLUTION INTRAVENOUS at 22:00

## 2023-08-01 RX ADMIN — Medication SCH STRIP: at 11:16

## 2023-08-01 RX ADMIN — METOPROLOL SUCCINATE SCH MG: 50 TABLET, EXTENDED RELEASE ORAL at 11:06

## 2023-08-01 RX ADMIN — SODIUM CHLORIDE SCH MLS/HR: 4.5 INJECTION, SOLUTION INTRAVENOUS at 06:24

## 2023-08-01 RX ADMIN — Medication SCH STRIP: at 06:19

## 2023-08-01 RX ADMIN — Medication SCH STRIP: at 17:25

## 2023-08-01 RX ADMIN — ZINC SULFATE CAP 220 MG (50 MG ELEMENTAL ZN) SCH MG: 220 (50 ZN) CAP at 11:06

## 2023-08-01 RX ADMIN — SODIUM CHLORIDE SCH ML: 9 INJECTION INTRAMUSCULAR; INTRAVENOUS; SUBCUTANEOUS at 22:02

## 2023-08-01 RX ADMIN — NICOTINE SCH PATCH: 21 PATCH, EXTENDED RELEASE TRANSDERMAL at 11:07

## 2023-08-01 RX ADMIN — HUMAN INSULIN SCH UNITS: 100 INJECTION, SOLUTION SUBCUTANEOUS at 22:06

## 2023-08-01 RX ADMIN — ATORVASTATIN CALCIUM SCH MG: 20 TABLET, FILM COATED ORAL at 22:02

## 2023-08-02 VITALS
RESPIRATION RATE: 18 BRPM | SYSTOLIC BLOOD PRESSURE: 151 MMHG | OXYGEN SATURATION: 99 % | TEMPERATURE: 98.1 F | HEART RATE: 66 BPM | DIASTOLIC BLOOD PRESSURE: 68 MMHG

## 2023-08-02 VITALS
TEMPERATURE: 98.1 F | OXYGEN SATURATION: 100 % | HEART RATE: 67 BPM | SYSTOLIC BLOOD PRESSURE: 130 MMHG | DIASTOLIC BLOOD PRESSURE: 70 MMHG | RESPIRATION RATE: 18 BRPM

## 2023-08-02 VITALS
OXYGEN SATURATION: 97 % | SYSTOLIC BLOOD PRESSURE: 142 MMHG | TEMPERATURE: 98.5 F | HEART RATE: 72 BPM | DIASTOLIC BLOOD PRESSURE: 67 MMHG | RESPIRATION RATE: 20 BRPM

## 2023-08-02 VITALS
OXYGEN SATURATION: 98 % | RESPIRATION RATE: 18 BRPM | SYSTOLIC BLOOD PRESSURE: 158 MMHG | DIASTOLIC BLOOD PRESSURE: 70 MMHG | HEART RATE: 87 BPM | TEMPERATURE: 97.9 F

## 2023-08-02 VITALS
DIASTOLIC BLOOD PRESSURE: 73 MMHG | HEART RATE: 74 BPM | RESPIRATION RATE: 20 BRPM | TEMPERATURE: 98.3 F | SYSTOLIC BLOOD PRESSURE: 150 MMHG | OXYGEN SATURATION: 99 %

## 2023-08-02 VITALS
DIASTOLIC BLOOD PRESSURE: 70 MMHG | SYSTOLIC BLOOD PRESSURE: 152 MMHG | TEMPERATURE: 97.9 F | HEART RATE: 70 BPM | RESPIRATION RATE: 18 BRPM | OXYGEN SATURATION: 98 %

## 2023-08-02 VITALS
RESPIRATION RATE: 21 BRPM | TEMPERATURE: 98.3 F | SYSTOLIC BLOOD PRESSURE: 148 MMHG | HEART RATE: 67 BPM | OXYGEN SATURATION: 95 % | DIASTOLIC BLOOD PRESSURE: 72 MMHG

## 2023-08-02 RX ADMIN — HUMAN INSULIN SCH UNITS: 100 INJECTION, SOLUTION SUBCUTANEOUS at 06:22

## 2023-08-02 RX ADMIN — Medication SCH STRIP: at 21:32

## 2023-08-02 RX ADMIN — Medication SCH STRIP: at 17:08

## 2023-08-02 RX ADMIN — HUMAN INSULIN SCH UNITS: 100 INJECTION, SOLUTION SUBCUTANEOUS at 11:41

## 2023-08-02 RX ADMIN — DEXTROSE SCH UNITS: 5 SOLUTION INTRAVENOUS at 21:36

## 2023-08-02 RX ADMIN — SODIUM CHLORIDE SCH ML: 9 INJECTION INTRAMUSCULAR; INTRAVENOUS; SUBCUTANEOUS at 09:16

## 2023-08-02 RX ADMIN — ZINC SULFATE CAP 220 MG (50 MG ELEMENTAL ZN) SCH MG: 220 (50 ZN) CAP at 09:15

## 2023-08-02 RX ADMIN — METOPROLOL SUCCINATE SCH MG: 50 TABLET, EXTENDED RELEASE ORAL at 09:16

## 2023-08-02 RX ADMIN — DEXTROSE SCH UNITS: 5 SOLUTION INTRAVENOUS at 11:42

## 2023-08-02 RX ADMIN — OXYCODONE HYDROCHLORIDE AND ACETAMINOPHEN SCH MG: 500 TABLET ORAL at 21:32

## 2023-08-02 RX ADMIN — SODIUM CHLORIDE SCH MLS/HR: 4.5 INJECTION, SOLUTION INTRAVENOUS at 15:41

## 2023-08-02 RX ADMIN — OXYCODONE HYDROCHLORIDE AND ACETAMINOPHEN SCH MG: 500 TABLET ORAL at 09:15

## 2023-08-02 RX ADMIN — ATORVASTATIN CALCIUM SCH MG: 20 TABLET, FILM COATED ORAL at 21:32

## 2023-08-02 RX ADMIN — HYDROCODONE BITARTRATE AND ACETAMINOPHEN PRN TAB: 5; 325 TABLET ORAL at 11:38

## 2023-08-02 RX ADMIN — HUMAN INSULIN SCH UNITS: 100 INJECTION, SOLUTION SUBCUTANEOUS at 21:37

## 2023-08-02 RX ADMIN — Medication SCH STRIP: at 11:38

## 2023-08-02 RX ADMIN — HUMAN INSULIN SCH UNITS: 100 INJECTION, SOLUTION SUBCUTANEOUS at 17:08

## 2023-08-02 RX ADMIN — SODIUM CHLORIDE SCH ML: 9 INJECTION INTRAMUSCULAR; INTRAVENOUS; SUBCUTANEOUS at 21:32

## 2023-08-02 RX ADMIN — LINEZOLID SCH MLS/HR: 600 INJECTION, SOLUTION INTRAVENOUS at 21:32

## 2023-08-02 RX ADMIN — HYDROCODONE BITARTRATE AND ACETAMINOPHEN PRN TAB: 5; 325 TABLET ORAL at 00:09

## 2023-08-02 RX ADMIN — DIPHENHYDRAMINE HYDROCHLORIDE PRN MG: 25 CAPSULE ORAL at 12:01

## 2023-08-02 RX ADMIN — Medication SCH STRIP: at 06:22

## 2023-08-02 RX ADMIN — SODIUM CHLORIDE SCH MLS/HR: 4.5 INJECTION, SOLUTION INTRAVENOUS at 02:30

## 2023-08-02 RX ADMIN — NICOTINE SCH PATCH: 21 PATCH, EXTENDED RELEASE TRANSDERMAL at 09:16

## 2023-08-03 VITALS
HEART RATE: 76 BPM | TEMPERATURE: 98.9 F | DIASTOLIC BLOOD PRESSURE: 73 MMHG | SYSTOLIC BLOOD PRESSURE: 150 MMHG | OXYGEN SATURATION: 95 % | RESPIRATION RATE: 18 BRPM

## 2023-08-03 VITALS — OXYGEN SATURATION: 97 % | RESPIRATION RATE: 18 BRPM

## 2023-08-03 VITALS
SYSTOLIC BLOOD PRESSURE: 138 MMHG | RESPIRATION RATE: 18 BRPM | OXYGEN SATURATION: 98 % | TEMPERATURE: 98.2 F | DIASTOLIC BLOOD PRESSURE: 82 MMHG | HEART RATE: 70 BPM

## 2023-08-03 VITALS
TEMPERATURE: 97.8 F | DIASTOLIC BLOOD PRESSURE: 74 MMHG | OXYGEN SATURATION: 97 % | SYSTOLIC BLOOD PRESSURE: 168 MMHG | RESPIRATION RATE: 17 BRPM | HEART RATE: 66 BPM

## 2023-08-03 VITALS
HEART RATE: 61 BPM | TEMPERATURE: 98.1 F | RESPIRATION RATE: 17 BRPM | DIASTOLIC BLOOD PRESSURE: 48 MMHG | OXYGEN SATURATION: 98 % | SYSTOLIC BLOOD PRESSURE: 125 MMHG

## 2023-08-03 VITALS — OXYGEN SATURATION: 99 % | RESPIRATION RATE: 20 BRPM | HEART RATE: 68 BPM

## 2023-08-03 VITALS
DIASTOLIC BLOOD PRESSURE: 58 MMHG | OXYGEN SATURATION: 98 % | SYSTOLIC BLOOD PRESSURE: 146 MMHG | TEMPERATURE: 98.2 F | HEART RATE: 66 BPM | RESPIRATION RATE: 17 BRPM

## 2023-08-03 LAB
ANION GAP SERPL CALCULATED.3IONS-SCNC: 6 MMOL/L (ref 5–15)
BUN SERPL-MCNC: 31 MG/DL (ref 7–18)
BUN/CREAT SERPL: 16.2 (ref 10–20)
CALCIUM SERPL-MCNC: 8 MG/DL (ref 8.5–10.1)
CHLORIDE SERPL-SCNC: 108 MMOL/L (ref 98–107)
CO2 SERPL-SCNC: 24 MMOL/L (ref 21–32)
GLUCOSE SERPL-MCNC: 187 MG/DL (ref 74–106)
POTASSIUM SERPL-SCNC: 3.8 MMOL/L (ref 3.5–5.1)
SODIUM SERPL-SCNC: 138 MMOL/L (ref 136–145)

## 2023-08-03 RX ADMIN — Medication SCH STRIP: at 11:28

## 2023-08-03 RX ADMIN — DIPHENHYDRAMINE HYDROCHLORIDE PRN MG: 25 CAPSULE ORAL at 02:37

## 2023-08-03 RX ADMIN — OXYCODONE HYDROCHLORIDE AND ACETAMINOPHEN SCH MG: 500 TABLET ORAL at 22:01

## 2023-08-03 RX ADMIN — LINEZOLID SCH MLS/HR: 600 INJECTION, SOLUTION INTRAVENOUS at 22:04

## 2023-08-03 RX ADMIN — FLUCONAZOLE IN SODIUM CHLORIDE SCH MLS/HR: 2 INJECTION, SOLUTION INTRAVENOUS at 09:52

## 2023-08-03 RX ADMIN — METOPROLOL SUCCINATE SCH MG: 50 TABLET, EXTENDED RELEASE ORAL at 09:54

## 2023-08-03 RX ADMIN — HUMAN INSULIN SCH UNITS: 100 INJECTION, SOLUTION SUBCUTANEOUS at 17:33

## 2023-08-03 RX ADMIN — NICOTINE SCH PATCH: 21 PATCH, EXTENDED RELEASE TRANSDERMAL at 09:53

## 2023-08-03 RX ADMIN — DEXTROSE SCH UNITS: 5 SOLUTION INTRAVENOUS at 22:03

## 2023-08-03 RX ADMIN — OXYCODONE HYDROCHLORIDE AND ACETAMINOPHEN SCH MG: 500 TABLET ORAL at 09:52

## 2023-08-03 RX ADMIN — SODIUM CHLORIDE SCH ML: 9 INJECTION INTRAMUSCULAR; INTRAVENOUS; SUBCUTANEOUS at 22:00

## 2023-08-03 RX ADMIN — SODIUM CHLORIDE SCH MLS/HR: 4.5 INJECTION, SOLUTION INTRAVENOUS at 18:44

## 2023-08-03 RX ADMIN — HYDROCODONE BITARTRATE AND ACETAMINOPHEN PRN TAB: 5; 325 TABLET ORAL at 02:38

## 2023-08-03 RX ADMIN — Medication SCH STRIP: at 22:01

## 2023-08-03 RX ADMIN — HYDROCODONE BITARTRATE AND ACETAMINOPHEN PRN TAB: 5; 325 TABLET ORAL at 11:43

## 2023-08-03 RX ADMIN — ZINC SULFATE CAP 220 MG (50 MG ELEMENTAL ZN) SCH MG: 220 (50 ZN) CAP at 09:53

## 2023-08-03 RX ADMIN — ATORVASTATIN CALCIUM SCH MG: 20 TABLET, FILM COATED ORAL at 22:01

## 2023-08-03 RX ADMIN — DEXTROSE SCH UNITS: 5 SOLUTION INTRAVENOUS at 09:55

## 2023-08-03 RX ADMIN — Medication SCH STRIP: at 05:38

## 2023-08-03 RX ADMIN — HUMAN INSULIN SCH UNITS: 100 INJECTION, SOLUTION SUBCUTANEOUS at 11:28

## 2023-08-03 RX ADMIN — HUMAN INSULIN SCH UNITS: 100 INJECTION, SOLUTION SUBCUTANEOUS at 05:39

## 2023-08-03 RX ADMIN — SODIUM CHLORIDE SCH ML: 9 INJECTION INTRAMUSCULAR; INTRAVENOUS; SUBCUTANEOUS at 10:00

## 2023-08-03 RX ADMIN — Medication SCH STRIP: at 17:30

## 2023-08-03 RX ADMIN — LINEZOLID SCH MLS/HR: 600 INJECTION, SOLUTION INTRAVENOUS at 11:09

## 2023-08-03 RX ADMIN — DIPHENHYDRAMINE HYDROCHLORIDE PRN MG: 25 CAPSULE ORAL at 11:45

## 2023-08-03 RX ADMIN — HUMAN INSULIN SCH UNITS: 100 INJECTION, SOLUTION SUBCUTANEOUS at 22:03

## 2023-08-04 VITALS
TEMPERATURE: 98.1 F | OXYGEN SATURATION: 96 % | HEART RATE: 63 BPM | DIASTOLIC BLOOD PRESSURE: 64 MMHG | SYSTOLIC BLOOD PRESSURE: 130 MMHG | RESPIRATION RATE: 20 BRPM

## 2023-08-04 VITALS
RESPIRATION RATE: 18 BRPM | OXYGEN SATURATION: 97 % | HEART RATE: 66 BPM | SYSTOLIC BLOOD PRESSURE: 129 MMHG | DIASTOLIC BLOOD PRESSURE: 59 MMHG | TEMPERATURE: 98.1 F

## 2023-08-04 VITALS
SYSTOLIC BLOOD PRESSURE: 145 MMHG | HEART RATE: 72 BPM | OXYGEN SATURATION: 98 % | TEMPERATURE: 98.1 F | DIASTOLIC BLOOD PRESSURE: 65 MMHG | RESPIRATION RATE: 18 BRPM

## 2023-08-04 VITALS
HEART RATE: 63 BPM | DIASTOLIC BLOOD PRESSURE: 70 MMHG | TEMPERATURE: 98.1 F | RESPIRATION RATE: 19 BRPM | SYSTOLIC BLOOD PRESSURE: 141 MMHG | OXYGEN SATURATION: 96 %

## 2023-08-04 VITALS
DIASTOLIC BLOOD PRESSURE: 70 MMHG | OXYGEN SATURATION: 97 % | TEMPERATURE: 98.1 F | RESPIRATION RATE: 20 BRPM | SYSTOLIC BLOOD PRESSURE: 140 MMHG | HEART RATE: 64 BPM

## 2023-08-04 VITALS — OXYGEN SATURATION: 96 % | RESPIRATION RATE: 19 BRPM | HEART RATE: 63 BPM

## 2023-08-04 VITALS — RESPIRATION RATE: 20 BRPM

## 2023-08-04 LAB
ALBUMIN SERPL-MCNC: 2.3 G/DL (ref 3.4–5)
ALP SERPL-CCNC: 100 U/L (ref 45–117)
ALT SERPL-CCNC: 17 U/L (ref 16–61)
ANION GAP SERPL CALCULATED.3IONS-SCNC: 5 MMOL/L (ref 5–15)
BILIRUB SERPL-MCNC: 0.2 MG/DL (ref 0.2–1)
BUN SERPL-MCNC: 27 MG/DL (ref 7–18)
BUN/CREAT SERPL: 14.7 (ref 10–20)
CALCIUM SERPL-MCNC: 7.5 MG/DL (ref 8.5–10.1)
CHLORIDE SERPL-SCNC: 110 MMOL/L (ref 98–107)
CO2 SERPL-SCNC: 24 MMOL/L (ref 21–32)
GLUCOSE SERPL-MCNC: 142 MG/DL (ref 74–106)
HCT VFR BLD AUTO: 22.2 % (ref 41–53)
HGB BLD-MCNC: 7.3 G/DL (ref 13.5–17.5)
MCH RBC QN AUTO: 28.9 PG (ref 28–32)
MCV RBC AUTO: 87.7 FL (ref 80–100)
NRBC BLD QL AUTO: 0.1 %
POTASSIUM SERPL-SCNC: 3.9 MMOL/L (ref 3.5–5.1)
PROT SERPL-MCNC: 6.9 G/DL (ref 6.4–8.2)
SODIUM SERPL-SCNC: 139 MMOL/L (ref 136–145)

## 2023-08-04 RX ADMIN — OXYCODONE HYDROCHLORIDE AND ACETAMINOPHEN SCH MG: 500 TABLET ORAL at 10:38

## 2023-08-04 RX ADMIN — FLUCONAZOLE IN SODIUM CHLORIDE SCH MLS/HR: 2 INJECTION, SOLUTION INTRAVENOUS at 10:37

## 2023-08-04 RX ADMIN — SODIUM CHLORIDE SCH ML: 9 INJECTION INTRAMUSCULAR; INTRAVENOUS; SUBCUTANEOUS at 22:19

## 2023-08-04 RX ADMIN — SODIUM CHLORIDE SCH MLS/HR: 4.5 INJECTION, SOLUTION INTRAVENOUS at 14:30

## 2023-08-04 RX ADMIN — NICOTINE SCH PATCH: 21 PATCH, EXTENDED RELEASE TRANSDERMAL at 10:00

## 2023-08-04 RX ADMIN — Medication SCH STRIP: at 12:15

## 2023-08-04 RX ADMIN — LINEZOLID SCH MLS/HR: 600 INJECTION, SOLUTION INTRAVENOUS at 12:56

## 2023-08-04 RX ADMIN — HUMAN INSULIN SCH UNITS: 100 INJECTION, SOLUTION SUBCUTANEOUS at 12:14

## 2023-08-04 RX ADMIN — METOPROLOL SUCCINATE SCH MG: 50 TABLET, EXTENDED RELEASE ORAL at 10:38

## 2023-08-04 RX ADMIN — Medication SCH STRIP: at 05:59

## 2023-08-04 RX ADMIN — ATORVASTATIN CALCIUM SCH MG: 20 TABLET, FILM COATED ORAL at 22:19

## 2023-08-04 RX ADMIN — LINEZOLID SCH MLS/HR: 600 INJECTION, SOLUTION INTRAVENOUS at 10:36

## 2023-08-04 RX ADMIN — OXYCODONE HYDROCHLORIDE AND ACETAMINOPHEN SCH MG: 500 TABLET ORAL at 22:19

## 2023-08-04 RX ADMIN — HUMAN INSULIN SCH UNITS: 100 INJECTION, SOLUTION SUBCUTANEOUS at 17:00

## 2023-08-04 RX ADMIN — Medication SCH STRIP: at 22:04

## 2023-08-04 RX ADMIN — DEXTROSE SCH UNITS: 5 SOLUTION INTRAVENOUS at 10:48

## 2023-08-04 RX ADMIN — DEXTROSE SCH UNITS: 5 SOLUTION INTRAVENOUS at 22:14

## 2023-08-04 RX ADMIN — SODIUM CHLORIDE SCH ML: 9 INJECTION INTRAMUSCULAR; INTRAVENOUS; SUBCUTANEOUS at 10:38

## 2023-08-04 RX ADMIN — HUMAN INSULIN SCH UNITS: 100 INJECTION, SOLUTION SUBCUTANEOUS at 05:59

## 2023-08-04 RX ADMIN — ZINC SULFATE CAP 220 MG (50 MG ELEMENTAL ZN) SCH MG: 220 (50 ZN) CAP at 10:38

## 2023-08-04 RX ADMIN — HUMAN INSULIN SCH UNITS: 100 INJECTION, SOLUTION SUBCUTANEOUS at 22:07

## 2023-08-04 RX ADMIN — Medication SCH STRIP: at 17:26

## 2023-08-05 VITALS — RESPIRATION RATE: 19 BRPM

## 2023-08-05 VITALS
RESPIRATION RATE: 18 BRPM | TEMPERATURE: 98.6 F | DIASTOLIC BLOOD PRESSURE: 59 MMHG | SYSTOLIC BLOOD PRESSURE: 126 MMHG | OXYGEN SATURATION: 97 % | HEART RATE: 75 BPM

## 2023-08-05 VITALS
RESPIRATION RATE: 18 BRPM | TEMPERATURE: 98.2 F | OXYGEN SATURATION: 99 % | DIASTOLIC BLOOD PRESSURE: 71 MMHG | HEART RATE: 65 BPM | SYSTOLIC BLOOD PRESSURE: 163 MMHG

## 2023-08-05 VITALS
RESPIRATION RATE: 19 BRPM | TEMPERATURE: 98.1 F | OXYGEN SATURATION: 97 % | DIASTOLIC BLOOD PRESSURE: 70 MMHG | HEART RATE: 80 BPM | SYSTOLIC BLOOD PRESSURE: 139 MMHG

## 2023-08-05 VITALS
TEMPERATURE: 98.2 F | DIASTOLIC BLOOD PRESSURE: 62 MMHG | OXYGEN SATURATION: 96 % | HEART RATE: 67 BPM | SYSTOLIC BLOOD PRESSURE: 128 MMHG | RESPIRATION RATE: 18 BRPM

## 2023-08-05 VITALS — RESPIRATION RATE: 18 BRPM

## 2023-08-05 VITALS
RESPIRATION RATE: 15 BRPM | HEART RATE: 71 BPM | SYSTOLIC BLOOD PRESSURE: 166 MMHG | OXYGEN SATURATION: 99 % | TEMPERATURE: 98.3 F | DIASTOLIC BLOOD PRESSURE: 64 MMHG

## 2023-08-05 RX ADMIN — HYDRALAZINE HYDROCHLORIDE PRN MG: 20 INJECTION INTRAMUSCULAR; INTRAVENOUS at 16:30

## 2023-08-05 RX ADMIN — LINEZOLID SCH MLS/HR: 600 INJECTION, SOLUTION INTRAVENOUS at 09:04

## 2023-08-05 RX ADMIN — ATORVASTATIN CALCIUM SCH MG: 20 TABLET, FILM COATED ORAL at 21:50

## 2023-08-05 RX ADMIN — HUMAN INSULIN SCH UNITS: 100 INJECTION, SOLUTION SUBCUTANEOUS at 06:02

## 2023-08-05 RX ADMIN — Medication SCH STRIP: at 16:54

## 2023-08-05 RX ADMIN — HUMAN INSULIN SCH UNITS: 100 INJECTION, SOLUTION SUBCUTANEOUS at 16:57

## 2023-08-05 RX ADMIN — SODIUM CHLORIDE SCH ML: 9 INJECTION INTRAMUSCULAR; INTRAVENOUS; SUBCUTANEOUS at 09:03

## 2023-08-05 RX ADMIN — DIPHENHYDRAMINE HYDROCHLORIDE PRN MG: 25 CAPSULE ORAL at 02:44

## 2023-08-05 RX ADMIN — NICOTINE SCH PATCH: 21 PATCH, EXTENDED RELEASE TRANSDERMAL at 09:03

## 2023-08-05 RX ADMIN — HUMAN INSULIN SCH UNITS: 100 INJECTION, SOLUTION SUBCUTANEOUS at 21:40

## 2023-08-05 RX ADMIN — Medication SCH STRIP: at 21:18

## 2023-08-05 RX ADMIN — HYDRALAZINE HYDROCHLORIDE PRN MG: 20 INJECTION INTRAMUSCULAR; INTRAVENOUS at 05:00

## 2023-08-05 RX ADMIN — DIPHENHYDRAMINE HYDROCHLORIDE PRN MG: 25 CAPSULE ORAL at 22:42

## 2023-08-05 RX ADMIN — Medication SCH STRIP: at 05:59

## 2023-08-05 RX ADMIN — Medication SCH STRIP: at 11:47

## 2023-08-05 RX ADMIN — SODIUM CHLORIDE SCH MLS/HR: 4.5 INJECTION, SOLUTION INTRAVENOUS at 15:28

## 2023-08-05 RX ADMIN — DEXTROSE SCH UNITS: 5 SOLUTION INTRAVENOUS at 11:39

## 2023-08-05 RX ADMIN — OXYCODONE HYDROCHLORIDE AND ACETAMINOPHEN SCH MG: 500 TABLET ORAL at 09:03

## 2023-08-05 RX ADMIN — OXYCODONE HYDROCHLORIDE AND ACETAMINOPHEN SCH MG: 500 TABLET ORAL at 21:50

## 2023-08-05 RX ADMIN — LINEZOLID SCH MLS/HR: 600 INJECTION, SOLUTION INTRAVENOUS at 21:50

## 2023-08-05 RX ADMIN — HYDROCODONE BITARTRATE AND ACETAMINOPHEN PRN TAB: 5; 325 TABLET ORAL at 13:59

## 2023-08-05 RX ADMIN — SODIUM CHLORIDE SCH ML: 9 INJECTION INTRAMUSCULAR; INTRAVENOUS; SUBCUTANEOUS at 21:50

## 2023-08-05 RX ADMIN — METOPROLOL SUCCINATE SCH MG: 50 TABLET, EXTENDED RELEASE ORAL at 09:03

## 2023-08-05 RX ADMIN — DEXTROSE SCH UNITS: 5 SOLUTION INTRAVENOUS at 21:49

## 2023-08-05 RX ADMIN — HUMAN INSULIN SCH UNITS: 100 INJECTION, SOLUTION SUBCUTANEOUS at 11:30

## 2023-08-05 RX ADMIN — LINEZOLID SCH MLS/HR: 600 INJECTION, SOLUTION INTRAVENOUS at 00:15

## 2023-08-05 RX ADMIN — FLUCONAZOLE IN SODIUM CHLORIDE SCH MLS/HR: 2 INJECTION, SOLUTION INTRAVENOUS at 09:04

## 2023-08-05 RX ADMIN — ZINC SULFATE CAP 220 MG (50 MG ELEMENTAL ZN) SCH MG: 220 (50 ZN) CAP at 09:02

## 2023-08-06 VITALS
SYSTOLIC BLOOD PRESSURE: 149 MMHG | TEMPERATURE: 98.4 F | RESPIRATION RATE: 18 BRPM | OXYGEN SATURATION: 97 % | DIASTOLIC BLOOD PRESSURE: 69 MMHG | HEART RATE: 68 BPM

## 2023-08-06 VITALS
DIASTOLIC BLOOD PRESSURE: 66 MMHG | HEART RATE: 78 BPM | SYSTOLIC BLOOD PRESSURE: 146 MMHG | OXYGEN SATURATION: 97 % | TEMPERATURE: 97.7 F | RESPIRATION RATE: 19 BRPM

## 2023-08-06 VITALS — RESPIRATION RATE: 18 BRPM | HEART RATE: 66 BPM

## 2023-08-06 VITALS — RESPIRATION RATE: 18 BRPM

## 2023-08-06 VITALS
RESPIRATION RATE: 18 BRPM | DIASTOLIC BLOOD PRESSURE: 56 MMHG | HEART RATE: 81 BPM | SYSTOLIC BLOOD PRESSURE: 133 MMHG | OXYGEN SATURATION: 94 % | TEMPERATURE: 98.4 F

## 2023-08-06 VITALS
HEART RATE: 67 BPM | DIASTOLIC BLOOD PRESSURE: 51 MMHG | TEMPERATURE: 98.2 F | OXYGEN SATURATION: 97 % | RESPIRATION RATE: 19 BRPM | SYSTOLIC BLOOD PRESSURE: 150 MMHG

## 2023-08-06 VITALS
HEART RATE: 69 BPM | OXYGEN SATURATION: 95 % | DIASTOLIC BLOOD PRESSURE: 67 MMHG | RESPIRATION RATE: 20 BRPM | TEMPERATURE: 98.6 F | SYSTOLIC BLOOD PRESSURE: 147 MMHG

## 2023-08-06 RX ADMIN — ZINC SULFATE CAP 220 MG (50 MG ELEMENTAL ZN) SCH MG: 220 (50 ZN) CAP at 09:11

## 2023-08-06 RX ADMIN — OXYCODONE HYDROCHLORIDE AND ACETAMINOPHEN SCH MG: 500 TABLET ORAL at 22:04

## 2023-08-06 RX ADMIN — DEXTROSE SCH UNITS: 5 SOLUTION INTRAVENOUS at 22:03

## 2023-08-06 RX ADMIN — HUMAN INSULIN SCH UNITS: 100 INJECTION, SOLUTION SUBCUTANEOUS at 06:38

## 2023-08-06 RX ADMIN — METOPROLOL SUCCINATE SCH MG: 50 TABLET, EXTENDED RELEASE ORAL at 09:11

## 2023-08-06 RX ADMIN — Medication SCH STRIP: at 12:51

## 2023-08-06 RX ADMIN — Medication SCH STRIP: at 22:04

## 2023-08-06 RX ADMIN — SODIUM CHLORIDE SCH MLS/HR: 4.5 INJECTION, SOLUTION INTRAVENOUS at 06:30

## 2023-08-06 RX ADMIN — LINEZOLID SCH MLS/HR: 600 INJECTION, SOLUTION INTRAVENOUS at 22:04

## 2023-08-06 RX ADMIN — SODIUM CHLORIDE SCH ML: 9 INJECTION INTRAMUSCULAR; INTRAVENOUS; SUBCUTANEOUS at 09:11

## 2023-08-06 RX ADMIN — LINEZOLID SCH MLS/HR: 600 INJECTION, SOLUTION INTRAVENOUS at 12:45

## 2023-08-06 RX ADMIN — SODIUM CHLORIDE SCH ML: 9 INJECTION INTRAMUSCULAR; INTRAVENOUS; SUBCUTANEOUS at 22:04

## 2023-08-06 RX ADMIN — NICOTINE SCH PATCH: 21 PATCH, EXTENDED RELEASE TRANSDERMAL at 09:11

## 2023-08-06 RX ADMIN — ATORVASTATIN CALCIUM SCH MG: 20 TABLET, FILM COATED ORAL at 22:04

## 2023-08-06 RX ADMIN — HUMAN INSULIN SCH UNITS: 100 INJECTION, SOLUTION SUBCUTANEOUS at 22:00

## 2023-08-06 RX ADMIN — OXYCODONE HYDROCHLORIDE AND ACETAMINOPHEN SCH MG: 500 TABLET ORAL at 09:12

## 2023-08-06 RX ADMIN — DEXTROSE SCH UNITS: 5 SOLUTION INTRAVENOUS at 12:50

## 2023-08-06 RX ADMIN — HUMAN INSULIN SCH UNITS: 100 INJECTION, SOLUTION SUBCUTANEOUS at 11:30

## 2023-08-06 RX ADMIN — FLUCONAZOLE IN SODIUM CHLORIDE SCH MLS/HR: 2 INJECTION, SOLUTION INTRAVENOUS at 09:13

## 2023-08-06 RX ADMIN — Medication SCH STRIP: at 06:34

## 2023-08-06 RX ADMIN — Medication SCH STRIP: at 17:15

## 2023-08-06 RX ADMIN — HUMAN INSULIN SCH UNITS: 100 INJECTION, SOLUTION SUBCUTANEOUS at 17:16

## 2023-08-07 VITALS
DIASTOLIC BLOOD PRESSURE: 67 MMHG | SYSTOLIC BLOOD PRESSURE: 149 MMHG | RESPIRATION RATE: 20 BRPM | TEMPERATURE: 98.3 F | OXYGEN SATURATION: 99 % | HEART RATE: 64 BPM

## 2023-08-07 VITALS
OXYGEN SATURATION: 99 % | TEMPERATURE: 98.3 F | RESPIRATION RATE: 18 BRPM | SYSTOLIC BLOOD PRESSURE: 159 MMHG | DIASTOLIC BLOOD PRESSURE: 73 MMHG | HEART RATE: 67 BPM

## 2023-08-07 VITALS
DIASTOLIC BLOOD PRESSURE: 74 MMHG | HEART RATE: 64 BPM | TEMPERATURE: 98.4 F | OXYGEN SATURATION: 100 % | RESPIRATION RATE: 18 BRPM | SYSTOLIC BLOOD PRESSURE: 149 MMHG

## 2023-08-07 VITALS — RESPIRATION RATE: 18 BRPM | HEART RATE: 66 BPM

## 2023-08-07 VITALS
SYSTOLIC BLOOD PRESSURE: 151 MMHG | OXYGEN SATURATION: 97 % | HEART RATE: 74 BPM | TEMPERATURE: 98 F | DIASTOLIC BLOOD PRESSURE: 70 MMHG | RESPIRATION RATE: 20 BRPM

## 2023-08-07 VITALS
RESPIRATION RATE: 20 BRPM | HEART RATE: 64 BPM | OXYGEN SATURATION: 99 % | TEMPERATURE: 98.3 F | SYSTOLIC BLOOD PRESSURE: 146 MMHG | DIASTOLIC BLOOD PRESSURE: 67 MMHG

## 2023-08-07 VITALS
TEMPERATURE: 98.5 F | DIASTOLIC BLOOD PRESSURE: 77 MMHG | SYSTOLIC BLOOD PRESSURE: 144 MMHG | HEART RATE: 80 BPM | OXYGEN SATURATION: 96 % | RESPIRATION RATE: 19 BRPM

## 2023-08-07 RX ADMIN — NICOTINE SCH PATCH: 21 PATCH, EXTENDED RELEASE TRANSDERMAL at 09:05

## 2023-08-07 RX ADMIN — Medication SCH STRIP: at 22:57

## 2023-08-07 RX ADMIN — HUMAN INSULIN SCH UNITS: 100 INJECTION, SOLUTION SUBCUTANEOUS at 23:00

## 2023-08-07 RX ADMIN — DEXTROSE SCH UNITS: 5 SOLUTION INTRAVENOUS at 09:16

## 2023-08-07 RX ADMIN — SODIUM CHLORIDE SCH ML: 9 INJECTION INTRAMUSCULAR; INTRAVENOUS; SUBCUTANEOUS at 09:06

## 2023-08-07 RX ADMIN — HUMAN INSULIN SCH UNITS: 100 INJECTION, SOLUTION SUBCUTANEOUS at 06:20

## 2023-08-07 RX ADMIN — ZINC SULFATE CAP 220 MG (50 MG ELEMENTAL ZN) SCH MG: 220 (50 ZN) CAP at 09:03

## 2023-08-07 RX ADMIN — HUMAN INSULIN SCH UNITS: 100 INJECTION, SOLUTION SUBCUTANEOUS at 11:30

## 2023-08-07 RX ADMIN — LINEZOLID SCH MLS/HR: 600 INJECTION, SOLUTION INTRAVENOUS at 23:03

## 2023-08-07 RX ADMIN — LINEZOLID SCH MLS/HR: 600 INJECTION, SOLUTION INTRAVENOUS at 10:31

## 2023-08-07 RX ADMIN — OXYCODONE HYDROCHLORIDE AND ACETAMINOPHEN SCH MG: 500 TABLET ORAL at 23:06

## 2023-08-07 RX ADMIN — SODIUM CHLORIDE SCH MLS/HR: 4.5 INJECTION, SOLUTION INTRAVENOUS at 22:30

## 2023-08-07 RX ADMIN — SODIUM CHLORIDE SCH ML: 9 INJECTION INTRAMUSCULAR; INTRAVENOUS; SUBCUTANEOUS at 23:07

## 2023-08-07 RX ADMIN — METOPROLOL SUCCINATE SCH MG: 50 TABLET, EXTENDED RELEASE ORAL at 09:06

## 2023-08-07 RX ADMIN — Medication SCH STRIP: at 18:01

## 2023-08-07 RX ADMIN — OXYCODONE HYDROCHLORIDE AND ACETAMINOPHEN SCH MG: 500 TABLET ORAL at 09:03

## 2023-08-07 RX ADMIN — ATORVASTATIN CALCIUM SCH MG: 20 TABLET, FILM COATED ORAL at 23:05

## 2023-08-07 RX ADMIN — HUMAN INSULIN SCH UNITS: 100 INJECTION, SOLUTION SUBCUTANEOUS at 18:08

## 2023-08-07 RX ADMIN — Medication SCH STRIP: at 12:31

## 2023-08-07 RX ADMIN — DIPHENHYDRAMINE HYDROCHLORIDE PRN MG: 25 CAPSULE ORAL at 23:07

## 2023-08-07 RX ADMIN — SODIUM CHLORIDE SCH MLS/HR: 4.5 INJECTION, SOLUTION INTRAVENOUS at 02:30

## 2023-08-07 RX ADMIN — Medication SCH STRIP: at 06:16

## 2023-08-07 RX ADMIN — FLUCONAZOLE IN SODIUM CHLORIDE SCH MLS/HR: 2 INJECTION, SOLUTION INTRAVENOUS at 09:05

## 2023-08-07 RX ADMIN — DEXTROSE SCH UNITS: 5 SOLUTION INTRAVENOUS at 23:04

## 2023-08-08 VITALS
TEMPERATURE: 98.1 F | RESPIRATION RATE: 20 BRPM | DIASTOLIC BLOOD PRESSURE: 53 MMHG | SYSTOLIC BLOOD PRESSURE: 140 MMHG | HEART RATE: 64 BPM | OXYGEN SATURATION: 97 %

## 2023-08-08 VITALS
DIASTOLIC BLOOD PRESSURE: 53 MMHG | OXYGEN SATURATION: 97 % | SYSTOLIC BLOOD PRESSURE: 140 MMHG | HEART RATE: 64 BPM | TEMPERATURE: 98.1 F | RESPIRATION RATE: 20 BRPM

## 2023-08-08 VITALS
DIASTOLIC BLOOD PRESSURE: 73 MMHG | RESPIRATION RATE: 18 BRPM | SYSTOLIC BLOOD PRESSURE: 156 MMHG | OXYGEN SATURATION: 99 % | TEMPERATURE: 98.1 F | HEART RATE: 67 BPM

## 2023-08-08 VITALS
OXYGEN SATURATION: 95 % | RESPIRATION RATE: 19 BRPM | HEART RATE: 74 BPM | DIASTOLIC BLOOD PRESSURE: 83 MMHG | TEMPERATURE: 98.2 F | SYSTOLIC BLOOD PRESSURE: 185 MMHG

## 2023-08-08 RX ADMIN — DEXTROSE SCH UNITS: 5 SOLUTION INTRAVENOUS at 09:19

## 2023-08-08 RX ADMIN — FLUCONAZOLE IN SODIUM CHLORIDE SCH MLS/HR: 2 INJECTION, SOLUTION INTRAVENOUS at 08:57

## 2023-08-08 RX ADMIN — HUMAN INSULIN SCH UNITS: 100 INJECTION, SOLUTION SUBCUTANEOUS at 06:05

## 2023-08-08 RX ADMIN — Medication SCH STRIP: at 06:03

## 2023-08-08 RX ADMIN — LINEZOLID SCH MLS/HR: 600 INJECTION, SOLUTION INTRAVENOUS at 10:12

## 2023-08-08 RX ADMIN — HUMAN INSULIN SCH UNITS: 100 INJECTION, SOLUTION SUBCUTANEOUS at 12:06

## 2023-08-08 RX ADMIN — OXYCODONE HYDROCHLORIDE AND ACETAMINOPHEN SCH MG: 500 TABLET ORAL at 09:11

## 2023-08-08 RX ADMIN — Medication SCH STRIP: at 12:08

## 2023-08-08 RX ADMIN — SODIUM CHLORIDE SCH ML: 9 INJECTION INTRAMUSCULAR; INTRAVENOUS; SUBCUTANEOUS at 09:09

## 2023-08-08 RX ADMIN — ZINC SULFATE CAP 220 MG (50 MG ELEMENTAL ZN) SCH MG: 220 (50 ZN) CAP at 09:11

## 2023-08-08 RX ADMIN — HYDRALAZINE HYDROCHLORIDE PRN MG: 20 INJECTION INTRAMUSCULAR; INTRAVENOUS at 12:13

## 2023-08-08 RX ADMIN — NICOTINE SCH PATCH: 21 PATCH, EXTENDED RELEASE TRANSDERMAL at 09:13

## 2023-08-08 RX ADMIN — METOPROLOL SUCCINATE SCH MG: 50 TABLET, EXTENDED RELEASE ORAL at 09:12

## 2025-04-15 ENCOUNTER — HOSPITAL ENCOUNTER (INPATIENT)
Dept: HOSPITAL 15 - ER | Age: 60
LOS: 7 days | Discharge: HOME | DRG: 629 | End: 2025-04-22
Attending: HOSPITALIST | Admitting: HOSPITALIST
Payer: COMMERCIAL

## 2025-04-15 VITALS — HEART RATE: 101 BPM | OXYGEN SATURATION: 96 % | RESPIRATION RATE: 16 BRPM

## 2025-04-15 VITALS — WEIGHT: 156.53 LBS | BODY MASS INDEX: 24.57 KG/M2 | HEIGHT: 67 IN

## 2025-04-15 DIAGNOSIS — B95.2: ICD-10-CM

## 2025-04-15 DIAGNOSIS — B95.62: ICD-10-CM

## 2025-04-15 DIAGNOSIS — Z20.822: ICD-10-CM

## 2025-04-15 DIAGNOSIS — Z59.01: ICD-10-CM

## 2025-04-15 DIAGNOSIS — F17.200: ICD-10-CM

## 2025-04-15 DIAGNOSIS — M86.8X7: ICD-10-CM

## 2025-04-15 DIAGNOSIS — E11.628: ICD-10-CM

## 2025-04-15 DIAGNOSIS — Z79.84: ICD-10-CM

## 2025-04-15 DIAGNOSIS — E11.69: Primary | ICD-10-CM

## 2025-04-15 DIAGNOSIS — L03.116: ICD-10-CM

## 2025-04-15 DIAGNOSIS — Z88.0: ICD-10-CM

## 2025-04-15 DIAGNOSIS — Z16.21: ICD-10-CM

## 2025-04-15 DIAGNOSIS — L97.529: ICD-10-CM

## 2025-04-15 DIAGNOSIS — E11.621: ICD-10-CM

## 2025-04-15 DIAGNOSIS — E11.40: ICD-10-CM

## 2025-04-15 DIAGNOSIS — Z91.199: ICD-10-CM

## 2025-04-15 DIAGNOSIS — Z79.899: ICD-10-CM

## 2025-04-15 DIAGNOSIS — N17.0: ICD-10-CM

## 2025-04-15 DIAGNOSIS — I10: ICD-10-CM

## 2025-04-15 DIAGNOSIS — E11.65: ICD-10-CM

## 2025-04-15 LAB
ANION GAP SERPL CALCULATED.3IONS-SCNC: 9 MMOL/L (ref 5–15)
BUN SERPL-MCNC: 29 MG/DL (ref 9–23)
CALCIUM SERPL-MCNC: 9.6 MG/DL (ref 8.7–10.4)
CHLORIDE SERPL-SCNC: 108 MMOL/L (ref 98–107)
CO2 SERPL-SCNC: 22 MMOL/L (ref 20–31)
GLUCOSE SERPL-MCNC: 113 MG/DL (ref 74–106)
HCT VFR BLD AUTO: 33.6 % (ref 41–53)
HGB BLD-MCNC: 10.9 G/DL (ref 13.5–17.5)
MCH RBC QN AUTO: 30.2 PG (ref 28–32)
MCV RBC AUTO: 92.6 FL (ref 80–100)
POTASSIUM SERPL-SCNC: 4.2 MMOL/L (ref 3.5–5.1)
SARS-COV+SARS-COV-2 AG RESP QL IA.RAPID: NEGATIVE
SODIUM SERPL-SCNC: 139 MMOL/L (ref 136–145)

## 2025-04-15 PROCEDURE — 87075 CULTR BACTERIA EXCEPT BLOOD: CPT

## 2025-04-15 PROCEDURE — 84300 ASSAY OF URINE SODIUM: CPT

## 2025-04-15 PROCEDURE — 97530 THERAPEUTIC ACTIVITIES: CPT

## 2025-04-15 PROCEDURE — 83735 ASSAY OF MAGNESIUM: CPT

## 2025-04-15 PROCEDURE — 87426 SARSCOV CORONAVIRUS AG IA: CPT

## 2025-04-15 PROCEDURE — 85025 COMPLETE CBC W/AUTO DIFF WBC: CPT

## 2025-04-15 PROCEDURE — 83550 IRON BINDING TEST: CPT

## 2025-04-15 PROCEDURE — 87040 BLOOD CULTURE FOR BACTERIA: CPT

## 2025-04-15 PROCEDURE — 84443 ASSAY THYROID STIM HORMONE: CPT

## 2025-04-15 PROCEDURE — 83605 ASSAY OF LACTIC ACID: CPT

## 2025-04-15 PROCEDURE — 83036 HEMOGLOBIN GLYCOSYLATED A1C: CPT

## 2025-04-15 PROCEDURE — 87077 CULTURE AEROBIC IDENTIFY: CPT

## 2025-04-15 PROCEDURE — 84100 ASSAY OF PHOSPHORUS: CPT

## 2025-04-15 PROCEDURE — 80048 BASIC METABOLIC PNL TOTAL CA: CPT

## 2025-04-15 PROCEDURE — 71045 X-RAY EXAM CHEST 1 VIEW: CPT

## 2025-04-15 PROCEDURE — 36415 COLL VENOUS BLD VENIPUNCTURE: CPT

## 2025-04-15 PROCEDURE — 80307 DRUG TEST PRSMV CHEM ANLYZR: CPT

## 2025-04-15 PROCEDURE — 73718 MRI LOWER EXTREMITY W/O DYE: CPT

## 2025-04-15 PROCEDURE — 84550 ASSAY OF BLOOD/URIC ACID: CPT

## 2025-04-15 PROCEDURE — 82728 ASSAY OF FERRITIN: CPT

## 2025-04-15 PROCEDURE — 84484 ASSAY OF TROPONIN QUANT: CPT

## 2025-04-15 PROCEDURE — 97116 GAIT TRAINING THERAPY: CPT

## 2025-04-15 PROCEDURE — 80053 COMPREHEN METABOLIC PANEL: CPT

## 2025-04-15 PROCEDURE — 87205 SMEAR GRAM STAIN: CPT

## 2025-04-15 PROCEDURE — 85610 PROTHROMBIN TIME: CPT

## 2025-04-15 PROCEDURE — 87070 CULTURE OTHR SPECIMN AEROBIC: CPT

## 2025-04-15 PROCEDURE — 80061 LIPID PANEL: CPT

## 2025-04-15 PROCEDURE — 84156 ASSAY OF PROTEIN URINE: CPT

## 2025-04-15 PROCEDURE — 85730 THROMBOPLASTIN TIME PARTIAL: CPT

## 2025-04-15 PROCEDURE — 83540 ASSAY OF IRON: CPT

## 2025-04-15 PROCEDURE — 96365 THER/PROPH/DIAG IV INF INIT: CPT

## 2025-04-15 PROCEDURE — 81001 URINALYSIS AUTO W/SCOPE: CPT

## 2025-04-15 PROCEDURE — 96375 TX/PRO/DX INJ NEW DRUG ADDON: CPT

## 2025-04-15 PROCEDURE — 73620 X-RAY EXAM OF FOOT: CPT

## 2025-04-15 PROCEDURE — 97163 PT EVAL HIGH COMPLEX 45 MIN: CPT

## 2025-04-15 PROCEDURE — 93925 LOWER EXTREMITY STUDY: CPT

## 2025-04-15 PROCEDURE — 76775 US EXAM ABDO BACK WALL LIM: CPT

## 2025-04-15 PROCEDURE — 82962 GLUCOSE BLOOD TEST: CPT

## 2025-04-15 PROCEDURE — 87186 SC STD MICRODIL/AGAR DIL: CPT

## 2025-04-15 PROCEDURE — 82570 ASSAY OF URINE CREATININE: CPT

## 2025-04-15 RX ADMIN — ACETAMINOPHEN ONE MG: 325 TABLET ORAL at 20:14

## 2025-04-15 RX ADMIN — MORPHINE SULFATE ONE MG: 4 INJECTION, SOLUTION INTRAMUSCULAR; INTRAVENOUS at 17:30

## 2025-04-15 RX ADMIN — SODIUM CHLORIDE ONE MLS/HR: 0.9 INJECTION, SOLUTION INTRAVENOUS at 17:48

## 2025-04-15 RX ADMIN — ONDANSETRON HYDROCHLORIDE ONE MG: 2 INJECTION, SOLUTION INTRAMUSCULAR; INTRAVENOUS at 17:29

## 2025-04-15 RX ADMIN — SODIUM CHLORIDE ONE MLS/HR: 0.9 INJECTION, SOLUTION INTRAVENOUS at 16:35

## 2025-04-15 SDOH — ECONOMIC STABILITY - HOUSING INSECURITY: SHELTERED HOMELESSNESS: Z59.01

## 2025-04-16 VITALS — HEART RATE: 89 BPM | OXYGEN SATURATION: 99 % | RESPIRATION RATE: 16 BRPM

## 2025-04-16 VITALS — RESPIRATION RATE: 14 BRPM | HEART RATE: 84 BPM | OXYGEN SATURATION: 96 %

## 2025-04-16 LAB
ALBUMIN SERPL-MCNC: 4.1 G/DL (ref 3.2–4.8)
ALP SERPL-CCNC: 143 U/L (ref 46–116)
ALT SERPL-CCNC: 10 U/L (ref 7–40)
ANION GAP SERPL CALCULATED.3IONS-SCNC: 10 MMOL/L (ref 5–15)
APTT PPP: 30.3 SEC (ref 24.5–34.5)
BILIRUB SERPL-MCNC: 0.2 MG/DL (ref 0.2–1)
BUN SERPL-MCNC: 32 MG/DL (ref 9–23)
BUN/CREAT SERPL: 15.6 (ref 10–20)
CALCIUM SERPL-MCNC: 9.1 MG/DL (ref 8.7–10.4)
CHLORIDE SERPL-SCNC: 108 MMOL/L (ref 98–107)
CO2 SERPL-SCNC: 20 MMOL/L (ref 20–31)
GLUCOSE SERPL-MCNC: 203 MG/DL (ref 74–106)
HCT VFR BLD AUTO: 30.4 % (ref 41–53)
HGB BLD-MCNC: 10.1 G/DL (ref 13.5–17.5)
INR PPP: 1.03 (ref 0.9–1.15)
MAGNESIUM SERPL-MCNC: 1.4 MG/DL (ref 1.6–2.6)
MCH RBC QN AUTO: 30.6 PG (ref 28–32)
MCV RBC AUTO: 91.9 FL (ref 80–100)
POTASSIUM SERPL-SCNC: 4.4 MMOL/L (ref 3.5–5.1)
PROT SERPL-MCNC: 7.6 G/DL (ref 5.7–8.2)
PROT UR STRIP.AUTO-MCNC: (no result) MG/DL
PROTHROMBIN TIME: 10.9 SEC (ref 9.3–11.8)
SODIUM SERPL-SCNC: 138 MMOL/L (ref 136–145)

## 2025-04-16 RX ADMIN — SODIUM CHLORIDE ONE MLS/HR: 0.9 INJECTION, SOLUTION INTRAVENOUS at 09:25

## 2025-04-16 RX ADMIN — HYDROCODONE BITARTRATE AND ACETAMINOPHEN PRN TAB: 5; 325 TABLET ORAL at 20:17

## 2025-04-16 RX ADMIN — SODIUM CHLORIDE ONE MLS/HR: 0.9 INJECTION, SOLUTION INTRAVENOUS at 10:17

## 2025-04-16 RX ADMIN — CARVEDILOL SCH MG: 3.12 TABLET, FILM COATED ORAL at 22:25

## 2025-04-16 RX ADMIN — Medication SCH STRIP: at 11:51

## 2025-04-16 RX ADMIN — METOPROLOL SUCCINATE ONE MG: 50 TABLET, EXTENDED RELEASE ORAL at 10:16

## 2025-04-16 RX ADMIN — LINEZOLID SCH MLS/HR: 600 INJECTION, SOLUTION INTRAVENOUS at 06:46

## 2025-04-16 RX ADMIN — HUMAN INSULIN SCH UNITS: 100 INJECTION, SOLUTION SUBCUTANEOUS at 11:54

## 2025-04-16 RX ADMIN — MAGNESIUM SULFATE IN DEXTROSE SCH MLS/HR: 10 INJECTION, SOLUTION INTRAVENOUS at 09:25

## 2025-04-16 RX ADMIN — ATORVASTATIN CALCIUM SCH MG: 20 TABLET, FILM COATED ORAL at 22:25

## 2025-04-16 RX ADMIN — MORPHINE SULFATE ONE MG: 4 INJECTION, SOLUTION INTRAMUSCULAR; INTRAVENOUS at 08:18

## 2025-04-17 VITALS
TEMPERATURE: 97.8 F | OXYGEN SATURATION: 100 % | DIASTOLIC BLOOD PRESSURE: 76 MMHG | RESPIRATION RATE: 18 BRPM | SYSTOLIC BLOOD PRESSURE: 140 MMHG | HEART RATE: 79 BPM

## 2025-04-17 VITALS
RESPIRATION RATE: 16 BRPM | TEMPERATURE: 97.9 F | HEART RATE: 78 BPM | DIASTOLIC BLOOD PRESSURE: 79 MMHG | OXYGEN SATURATION: 98 % | SYSTOLIC BLOOD PRESSURE: 142 MMHG

## 2025-04-17 VITALS
RESPIRATION RATE: 19 BRPM | TEMPERATURE: 97.8 F | HEART RATE: 76 BPM | OXYGEN SATURATION: 99 % | DIASTOLIC BLOOD PRESSURE: 44 MMHG | SYSTOLIC BLOOD PRESSURE: 101 MMHG

## 2025-04-17 VITALS
OXYGEN SATURATION: 98 % | SYSTOLIC BLOOD PRESSURE: 153 MMHG | RESPIRATION RATE: 18 BRPM | DIASTOLIC BLOOD PRESSURE: 76 MMHG | HEART RATE: 85 BPM

## 2025-04-17 VITALS
OXYGEN SATURATION: 100 % | RESPIRATION RATE: 16 BRPM | TEMPERATURE: 97.3 F | SYSTOLIC BLOOD PRESSURE: 165 MMHG | HEART RATE: 84 BPM | DIASTOLIC BLOOD PRESSURE: 85 MMHG

## 2025-04-17 VITALS
DIASTOLIC BLOOD PRESSURE: 90 MMHG | SYSTOLIC BLOOD PRESSURE: 141 MMHG | HEART RATE: 75 BPM | RESPIRATION RATE: 17 BRPM | TEMPERATURE: 97.8 F | OXYGEN SATURATION: 100 %

## 2025-04-17 LAB
CHOLEST SERPL-MCNC: 182 MG/DL (ref ?–200)
HDLC SERPL-MCNC: 36 MG/DL (ref 40–59)
TRIGL SERPL-MCNC: 162 MG/DL (ref ?–150)

## 2025-04-17 PROCEDURE — 0Y9N0ZZ DRAINAGE OF LEFT FOOT, OPEN APPROACH: ICD-10-PCS | Performed by: STUDENT IN AN ORGANIZED HEALTH CARE EDUCATION/TRAINING PROGRAM

## 2025-04-17 PROCEDURE — 0QBP0ZX EXCISION OF LEFT METATARSAL, OPEN APPROACH, DIAGNOSTIC: ICD-10-PCS | Performed by: STUDENT IN AN ORGANIZED HEALTH CARE EDUCATION/TRAINING PROGRAM

## 2025-04-17 RX ADMIN — MORPHINE SULFATE PRN MG: 2 INJECTION, SOLUTION INTRAMUSCULAR; INTRAVENOUS at 21:53

## 2025-04-17 RX ADMIN — SODIUM CHLORIDE SCH MLS/HR: 0.9 INJECTION, SOLUTION INTRAVENOUS at 16:35

## 2025-04-17 RX ADMIN — DEXTROSE ONE MG: 5 SOLUTION INTRAVENOUS at 13:30

## 2025-04-17 RX ADMIN — BUPIVACAINE HYDROCHLORIDE ONE ML: 5 INJECTION, SOLUTION EPIDURAL; INTRACAUDAL; PERINEURAL at 14:28

## 2025-04-17 RX ADMIN — CARVEDILOL SCH MG: 3.12 TABLET, FILM COATED ORAL at 22:00

## 2025-04-17 RX ADMIN — MAGNESIUM SULFATE IN DEXTROSE SCH MLS/HR: 10 INJECTION, SOLUTION INTRAVENOUS at 16:35

## 2025-04-18 VITALS
DIASTOLIC BLOOD PRESSURE: 74 MMHG | RESPIRATION RATE: 16 BRPM | SYSTOLIC BLOOD PRESSURE: 153 MMHG | HEART RATE: 93 BPM | OXYGEN SATURATION: 100 % | TEMPERATURE: 97.4 F

## 2025-04-18 VITALS
RESPIRATION RATE: 18 BRPM | DIASTOLIC BLOOD PRESSURE: 57 MMHG | TEMPERATURE: 98 F | SYSTOLIC BLOOD PRESSURE: 115 MMHG | HEART RATE: 76 BPM | OXYGEN SATURATION: 95 %

## 2025-04-18 VITALS
RESPIRATION RATE: 16 BRPM | HEART RATE: 89 BPM | OXYGEN SATURATION: 97 % | DIASTOLIC BLOOD PRESSURE: 79 MMHG | TEMPERATURE: 98.1 F | SYSTOLIC BLOOD PRESSURE: 163 MMHG

## 2025-04-18 VITALS
OXYGEN SATURATION: 100 % | TEMPERATURE: 98 F | HEART RATE: 75 BPM | RESPIRATION RATE: 18 BRPM | SYSTOLIC BLOOD PRESSURE: 145 MMHG | DIASTOLIC BLOOD PRESSURE: 77 MMHG

## 2025-04-18 VITALS
TEMPERATURE: 97.8 F | RESPIRATION RATE: 20 BRPM | OXYGEN SATURATION: 99 % | HEART RATE: 71 BPM | SYSTOLIC BLOOD PRESSURE: 152 MMHG | DIASTOLIC BLOOD PRESSURE: 65 MMHG

## 2025-04-18 VITALS
TEMPERATURE: 98 F | SYSTOLIC BLOOD PRESSURE: 132 MMHG | OXYGEN SATURATION: 96 % | DIASTOLIC BLOOD PRESSURE: 58 MMHG | HEART RATE: 76 BPM | RESPIRATION RATE: 20 BRPM

## 2025-04-18 VITALS
RESPIRATION RATE: 16 BRPM | SYSTOLIC BLOOD PRESSURE: 152 MMHG | HEART RATE: 74 BPM | OXYGEN SATURATION: 91 % | DIASTOLIC BLOOD PRESSURE: 73 MMHG | TEMPERATURE: 98.3 F

## 2025-04-18 VITALS — SYSTOLIC BLOOD PRESSURE: 149 MMHG | DIASTOLIC BLOOD PRESSURE: 75 MMHG

## 2025-04-18 VITALS — RESPIRATION RATE: 16 BRPM | OXYGEN SATURATION: 100 % | HEART RATE: 74 BPM

## 2025-04-18 LAB
ANION GAP SERPL CALCULATED.3IONS-SCNC: 8 MMOL/L (ref 5–15)
BUN SERPL-MCNC: 31 MG/DL (ref 9–23)
BUN/CREAT SERPL: 14.6 (ref 10–20)
CALCIUM SERPL-MCNC: 8.8 MG/DL (ref 8.7–10.4)
CHLORIDE SERPL-SCNC: 109 MMOL/L (ref 98–107)
CO2 SERPL-SCNC: 21 MMOL/L (ref 20–31)
GLUCOSE SERPL-MCNC: 113 MG/DL (ref 74–106)
HCT VFR BLD AUTO: 27.5 % (ref 41–53)
HGB BLD-MCNC: 9.3 G/DL (ref 13.5–17.5)
MAGNESIUM SERPL-MCNC: 2.5 MG/DL (ref 1.6–2.6)
MCH RBC QN AUTO: 30.6 PG (ref 28–32)
MCV RBC AUTO: 90.3 FL (ref 80–100)
POTASSIUM SERPL-SCNC: 4.3 MMOL/L (ref 3.5–5.1)
SODIUM SERPL-SCNC: 138 MMOL/L (ref 136–145)

## 2025-04-18 RX ADMIN — DIPHENHYDRAMINE HYDROCHLORIDE PRN MG: 25 CAPSULE ORAL at 14:46

## 2025-04-19 VITALS — RESPIRATION RATE: 16 BRPM | HEART RATE: 76 BPM | OXYGEN SATURATION: 97 %

## 2025-04-19 VITALS
RESPIRATION RATE: 16 BRPM | OXYGEN SATURATION: 95 % | SYSTOLIC BLOOD PRESSURE: 147 MMHG | HEART RATE: 75 BPM | TEMPERATURE: 98.2 F | DIASTOLIC BLOOD PRESSURE: 78 MMHG

## 2025-04-19 VITALS
HEART RATE: 69 BPM | SYSTOLIC BLOOD PRESSURE: 146 MMHG | OXYGEN SATURATION: 97 % | DIASTOLIC BLOOD PRESSURE: 78 MMHG | RESPIRATION RATE: 18 BRPM | TEMPERATURE: 97.9 F

## 2025-04-19 VITALS
TEMPERATURE: 98.1 F | OXYGEN SATURATION: 93 % | HEART RATE: 74 BPM | SYSTOLIC BLOOD PRESSURE: 138 MMHG | RESPIRATION RATE: 16 BRPM | DIASTOLIC BLOOD PRESSURE: 66 MMHG

## 2025-04-19 VITALS
OXYGEN SATURATION: 95 % | TEMPERATURE: 97.5 F | SYSTOLIC BLOOD PRESSURE: 137 MMHG | HEART RATE: 76 BPM | DIASTOLIC BLOOD PRESSURE: 67 MMHG | RESPIRATION RATE: 18 BRPM

## 2025-04-19 VITALS
SYSTOLIC BLOOD PRESSURE: 141 MMHG | DIASTOLIC BLOOD PRESSURE: 69 MMHG | RESPIRATION RATE: 16 BRPM | OXYGEN SATURATION: 97 % | HEART RATE: 76 BPM | TEMPERATURE: 97.6 F

## 2025-04-19 VITALS
RESPIRATION RATE: 16 BRPM | HEART RATE: 74 BPM | DIASTOLIC BLOOD PRESSURE: 56 MMHG | TEMPERATURE: 97.9 F | SYSTOLIC BLOOD PRESSURE: 121 MMHG | OXYGEN SATURATION: 98 %

## 2025-04-19 LAB
ANION GAP SERPL CALCULATED.3IONS-SCNC: 8 MMOL/L (ref 5–15)
BUN SERPL-MCNC: 36 MG/DL (ref 9–23)
BUN/CREAT SERPL: 15.3 (ref 10–20)
CALCIUM SERPL-MCNC: 8.7 MG/DL (ref 8.7–10.4)
CHLORIDE SERPL-SCNC: 106 MMOL/L (ref 98–107)
CO2 SERPL-SCNC: 22 MMOL/L (ref 20–31)
GLUCOSE SERPL-MCNC: 128 MG/DL (ref 74–106)
POTASSIUM SERPL-SCNC: 4.5 MMOL/L (ref 3.5–5.1)
SODIUM SERPL-SCNC: 136 MMOL/L (ref 136–145)

## 2025-04-19 RX ADMIN — HYDROCODONE BITARTRATE AND ACETAMINOPHEN PRN TAB: 10; 325 TABLET ORAL at 12:20

## 2025-04-19 RX ADMIN — DOXYCYCLINE SCH MG: 100 TABLET ORAL at 21:40

## 2025-04-19 RX ADMIN — SODIUM CHLORIDE SCH MLS/HR: 0.9 INJECTION, SOLUTION INTRAVENOUS at 13:30

## 2025-04-19 RX ADMIN — ONDANSETRON HYDROCHLORIDE PRN MG: 2 INJECTION, SOLUTION INTRAMUSCULAR; INTRAVENOUS at 12:07

## 2025-04-20 VITALS
TEMPERATURE: 98.6 F | DIASTOLIC BLOOD PRESSURE: 85 MMHG | OXYGEN SATURATION: 98 % | RESPIRATION RATE: 16 BRPM | SYSTOLIC BLOOD PRESSURE: 124 MMHG | HEART RATE: 69 BPM

## 2025-04-20 VITALS
DIASTOLIC BLOOD PRESSURE: 54 MMHG | RESPIRATION RATE: 16 BRPM | SYSTOLIC BLOOD PRESSURE: 126 MMHG | HEART RATE: 89 BPM | TEMPERATURE: 98.2 F | OXYGEN SATURATION: 95 %

## 2025-04-20 VITALS
OXYGEN SATURATION: 98 % | TEMPERATURE: 97.9 F | HEART RATE: 70 BPM | RESPIRATION RATE: 16 BRPM | SYSTOLIC BLOOD PRESSURE: 144 MMHG | DIASTOLIC BLOOD PRESSURE: 70 MMHG

## 2025-04-20 VITALS
TEMPERATURE: 98 F | OXYGEN SATURATION: 96 % | RESPIRATION RATE: 20 BRPM | HEART RATE: 68 BPM | DIASTOLIC BLOOD PRESSURE: 70 MMHG | SYSTOLIC BLOOD PRESSURE: 123 MMHG

## 2025-04-20 VITALS
SYSTOLIC BLOOD PRESSURE: 130 MMHG | TEMPERATURE: 98.8 F | DIASTOLIC BLOOD PRESSURE: 70 MMHG | RESPIRATION RATE: 19 BRPM | HEART RATE: 91 BPM | OXYGEN SATURATION: 99 %

## 2025-04-20 VITALS
RESPIRATION RATE: 20 BRPM | TEMPERATURE: 97.8 F | DIASTOLIC BLOOD PRESSURE: 52 MMHG | SYSTOLIC BLOOD PRESSURE: 109 MMHG | OXYGEN SATURATION: 94 % | HEART RATE: 69 BPM

## 2025-04-20 LAB
ANION GAP SERPL CALCULATED.3IONS-SCNC: 7 MMOL/L (ref 5–15)
BUN SERPL-MCNC: 40 MG/DL (ref 9–23)
BUN/CREAT SERPL: 14.9 (ref 10–20)
CALCIUM SERPL-MCNC: 8.9 MG/DL (ref 8.7–10.4)
CHLORIDE SERPL-SCNC: 107 MMOL/L (ref 98–107)
CO2 SERPL-SCNC: 22 MMOL/L (ref 20–31)
GLUCOSE SERPL-MCNC: 144 MG/DL (ref 74–106)
POTASSIUM SERPL-SCNC: 5.1 MMOL/L (ref 3.5–5.1)
PROT UR STRIP.AUTO-MCNC: (no result) MG/DL
PROT UR-MCNC: 240.2 MG/DL (ref 1–14)
SODIUM SERPL-SCNC: 136 MMOL/L (ref 136–145)

## 2025-04-20 RX ADMIN — LINEZOLID SCH MLS/HR: 600 INJECTION, SOLUTION INTRAVENOUS at 22:55

## 2025-04-21 VITALS — RESPIRATION RATE: 18 BRPM | HEART RATE: 92 BPM | OXYGEN SATURATION: 93 %

## 2025-04-21 VITALS
RESPIRATION RATE: 18 BRPM | HEART RATE: 90 BPM | DIASTOLIC BLOOD PRESSURE: 72 MMHG | OXYGEN SATURATION: 95 % | TEMPERATURE: 98.4 F | SYSTOLIC BLOOD PRESSURE: 167 MMHG

## 2025-04-21 VITALS
OXYGEN SATURATION: 96 % | HEART RATE: 80 BPM | RESPIRATION RATE: 18 BRPM | TEMPERATURE: 98.7 F | DIASTOLIC BLOOD PRESSURE: 70 MMHG | SYSTOLIC BLOOD PRESSURE: 136 MMHG

## 2025-04-21 VITALS
HEART RATE: 75 BPM | TEMPERATURE: 97.6 F | OXYGEN SATURATION: 98 % | RESPIRATION RATE: 18 BRPM | SYSTOLIC BLOOD PRESSURE: 102 MMHG | DIASTOLIC BLOOD PRESSURE: 54 MMHG

## 2025-04-21 VITALS
OXYGEN SATURATION: 99 % | RESPIRATION RATE: 18 BRPM | HEART RATE: 85 BPM | SYSTOLIC BLOOD PRESSURE: 114 MMHG | TEMPERATURE: 98.6 F | DIASTOLIC BLOOD PRESSURE: 56 MMHG

## 2025-04-21 VITALS
HEART RATE: 79 BPM | TEMPERATURE: 97.7 F | SYSTOLIC BLOOD PRESSURE: 128 MMHG | RESPIRATION RATE: 18 BRPM | OXYGEN SATURATION: 99 % | DIASTOLIC BLOOD PRESSURE: 77 MMHG

## 2025-04-21 VITALS
HEART RATE: 92 BPM | OXYGEN SATURATION: 93 % | TEMPERATURE: 98.1 F | RESPIRATION RATE: 18 BRPM | DIASTOLIC BLOOD PRESSURE: 72 MMHG | SYSTOLIC BLOOD PRESSURE: 140 MMHG

## 2025-04-21 LAB
ANION GAP SERPL CALCULATED.3IONS-SCNC: 11 MMOL/L (ref 5–15)
BUN SERPL-MCNC: 37 MG/DL (ref 9–23)
BUN/CREAT SERPL: 14.3 (ref 10–20)
CALCIUM SERPL-MCNC: 8.9 MG/DL (ref 8.7–10.4)
CHLORIDE SERPL-SCNC: 110 MMOL/L (ref 98–107)
CO2 SERPL-SCNC: 19 MMOL/L (ref 20–31)
GLUCOSE SERPL-MCNC: 96 MG/DL (ref 74–106)
HCT VFR BLD AUTO: 26.2 % (ref 41–53)
HGB BLD-MCNC: 8.7 G/DL (ref 13.5–17.5)
MCH RBC QN AUTO: 30.4 PG (ref 28–32)
MCV RBC AUTO: 91.3 FL (ref 80–100)
SODIUM SERPL-SCNC: 140 MMOL/L (ref 136–145)
URATE SERPL-MCNC: 8.4 MG/DL (ref 3.7–9.2)

## 2025-04-21 RX ADMIN — SODIUM CHLORIDE ONE MLS/HR: 0.9 INJECTION, SOLUTION INTRAVENOUS at 13:26

## 2025-04-21 RX ADMIN — DIPHENHYDRAMINE HYDROCHLORIDE PRN MG: 50 INJECTION INTRAMUSCULAR; INTRAVENOUS at 16:17

## 2025-04-21 RX ADMIN — DOXYCYCLINE SCH MLS/HR: 100 INJECTION, POWDER, LYOPHILIZED, FOR SOLUTION INTRAVENOUS at 21:29

## 2025-04-22 VITALS
SYSTOLIC BLOOD PRESSURE: 132 MMHG | RESPIRATION RATE: 17 BRPM | HEART RATE: 78 BPM | TEMPERATURE: 98.5 F | OXYGEN SATURATION: 98 % | DIASTOLIC BLOOD PRESSURE: 53 MMHG

## 2025-04-22 VITALS
OXYGEN SATURATION: 96 % | TEMPERATURE: 97.6 F | DIASTOLIC BLOOD PRESSURE: 80 MMHG | RESPIRATION RATE: 20 BRPM | SYSTOLIC BLOOD PRESSURE: 152 MMHG | HEART RATE: 74 BPM

## 2025-04-22 VITALS — OXYGEN SATURATION: 96 % | HEART RATE: 74 BPM | RESPIRATION RATE: 20 BRPM

## 2025-04-22 VITALS
RESPIRATION RATE: 20 BRPM | OXYGEN SATURATION: 96 % | DIASTOLIC BLOOD PRESSURE: 73 MMHG | HEART RATE: 86 BPM | TEMPERATURE: 97.6 F | SYSTOLIC BLOOD PRESSURE: 148 MMHG

## 2025-04-22 LAB
ANION GAP SERPL CALCULATED.3IONS-SCNC: 7 MMOL/L (ref 5–15)
BUN SERPL-MCNC: 40 MG/DL (ref 9–23)
BUN/CREAT SERPL: 16.6 (ref 10–20)
CHLORIDE SERPL-SCNC: 109 MMOL/L (ref 98–107)
CO2 SERPL-SCNC: 25 MMOL/L (ref 20–31)
GLUCOSE SERPL-MCNC: 150 MG/DL (ref 74–106)
HCT VFR BLD AUTO: 26.3 % (ref 41–53)
MCH RBC QN AUTO: 30.9 PG (ref 28–32)
MCV RBC AUTO: 90.4 FL (ref 80–100)
NRBC BLD QL AUTO: 0.1 %
SODIUM SERPL-SCNC: 141 MMOL/L (ref 136–145)

## 2025-05-21 ENCOUNTER — HOSPITAL ENCOUNTER (EMERGENCY)
Dept: HOSPITAL 15 - ER | Age: 60
Discharge: HOME | End: 2025-05-21
Payer: MEDICARE

## 2025-05-21 VITALS
TEMPERATURE: 98 F | DIASTOLIC BLOOD PRESSURE: 67 MMHG | HEART RATE: 86 BPM | OXYGEN SATURATION: 98 % | SYSTOLIC BLOOD PRESSURE: 138 MMHG | RESPIRATION RATE: 16 BRPM

## 2025-05-21 DIAGNOSIS — Z86.2: ICD-10-CM

## 2025-05-21 DIAGNOSIS — Z88.0: ICD-10-CM

## 2025-05-21 DIAGNOSIS — R19.7: Primary | ICD-10-CM

## 2025-05-21 DIAGNOSIS — Z79.899: ICD-10-CM

## 2025-05-21 DIAGNOSIS — E11.9: ICD-10-CM

## 2025-05-21 DIAGNOSIS — I10: ICD-10-CM

## 2025-05-21 LAB
ALBUMIN SERPL-MCNC: 3.9 G/DL (ref 3.2–4.8)
ALP SERPL-CCNC: 114 U/L (ref 46–116)
ALT SERPL-CCNC: 12 U/L (ref 7–40)
ANION GAP SERPL CALCULATED.3IONS-SCNC: 8 MMOL/L (ref 5–15)
BILIRUB SERPL-MCNC: 0.2 MG/DL (ref 0.2–1)
BUN SERPL-MCNC: 29 MG/DL (ref 9–23)
BUN/CREAT SERPL: 14.2 (ref 10–20)
CALCIUM SERPL-MCNC: 8.3 MG/DL (ref 8.7–10.4)
CHLORIDE SERPL-SCNC: 111 MMOL/L (ref 98–107)
CO2 SERPL-SCNC: 24 MMOL/L (ref 20–31)
GLUCOSE SERPL-MCNC: 143 MG/DL (ref 74–106)
HCT VFR BLD AUTO: 29.4 % (ref 41–53)
HGB BLD-MCNC: 9.8 G/DL (ref 13.5–17.5)
MCV RBC AUTO: 89.7 FL (ref 80–100)
POTASSIUM SERPL-SCNC: 4.2 MMOL/L (ref 3.5–5.1)
PROT SERPL-MCNC: 7.4 G/DL (ref 5.7–8.2)
PROT UR STRIP.AUTO-MCNC: (no result) MG/DL
SODIUM SERPL-SCNC: 143 MMOL/L (ref 136–145)

## 2025-05-21 PROCEDURE — 96361 HYDRATE IV INFUSION ADD-ON: CPT

## 2025-05-21 PROCEDURE — 36415 COLL VENOUS BLD VENIPUNCTURE: CPT

## 2025-05-21 PROCEDURE — 80053 COMPREHEN METABOLIC PANEL: CPT

## 2025-05-21 PROCEDURE — 83605 ASSAY OF LACTIC ACID: CPT

## 2025-05-21 PROCEDURE — 99283 EMERGENCY DEPT VISIT LOW MDM: CPT

## 2025-05-21 PROCEDURE — 87493 C DIFF AMPLIFIED PROBE: CPT

## 2025-05-21 PROCEDURE — 85025 COMPLETE CBC W/AUTO DIFF WBC: CPT

## 2025-05-21 PROCEDURE — 81001 URINALYSIS AUTO W/SCOPE: CPT

## 2025-05-21 PROCEDURE — 96360 HYDRATION IV INFUSION INIT: CPT

## 2025-05-21 PROCEDURE — 82947 ASSAY GLUCOSE BLOOD QUANT: CPT

## 2025-05-21 PROCEDURE — 82270 OCCULT BLOOD FECES: CPT

## 2025-05-21 PROCEDURE — 85048 AUTOMATED LEUKOCYTE COUNT: CPT

## 2025-05-21 PROCEDURE — 82962 GLUCOSE BLOOD TEST: CPT

## 2025-05-21 PROCEDURE — 87427 SHIGA-LIKE TOXIN AG IA: CPT

## 2025-05-21 PROCEDURE — 87045 FECES CULTURE AEROBIC BACT: CPT

## 2025-05-21 RX ADMIN — SODIUM CHLORIDE ONE MLS/HR: 0.9 INJECTION, SOLUTION INTRAVENOUS at 13:00

## 2025-05-21 RX ADMIN — HYDROCODONE BITARTRATE AND ACETAMINOPHEN ONE TAB: 5; 325 TABLET ORAL at 17:40
